# Patient Record
Sex: FEMALE | Race: ASIAN | NOT HISPANIC OR LATINO | Employment: FULL TIME | URBAN - METROPOLITAN AREA
[De-identification: names, ages, dates, MRNs, and addresses within clinical notes are randomized per-mention and may not be internally consistent; named-entity substitution may affect disease eponyms.]

---

## 2023-08-17 ENCOUNTER — OFFICE VISIT (OUTPATIENT)
Dept: URGENT CARE | Facility: URGENT CARE | Age: 23
End: 2023-08-17
Payer: COMMERCIAL

## 2023-08-17 VITALS
HEART RATE: 62 BPM | DIASTOLIC BLOOD PRESSURE: 88 MMHG | WEIGHT: 98.2 LBS | TEMPERATURE: 97.8 F | SYSTOLIC BLOOD PRESSURE: 136 MMHG | OXYGEN SATURATION: 99 %

## 2023-08-17 DIAGNOSIS — R00.2 PALPITATIONS: Primary | ICD-10-CM

## 2023-08-17 DIAGNOSIS — R42 DIZZINESS: ICD-10-CM

## 2023-08-17 PROCEDURE — 99204 OFFICE O/P NEW MOD 45 MIN: CPT | Mod: 25 | Performed by: PHYSICIAN ASSISTANT

## 2023-08-17 PROCEDURE — 93000 ELECTROCARDIOGRAM COMPLETE: CPT | Performed by: PHYSICIAN ASSISTANT

## 2023-08-17 ASSESSMENT — ENCOUNTER SYMPTOMS
CHILLS: 0
BACK PAIN: 0
EYES NEGATIVE: 1
NECK STIFFNESS: 0
JOINT SWELLING: 0
DIARRHEA: 0
ENDOCRINE NEGATIVE: 1
NAUSEA: 0
COUGH: 0
HEADACHES: 0
ARTHRALGIAS: 0
DIZZINESS: 1
SORE THROAT: 0
WOUND: 0
MYALGIAS: 0
ALLERGIC/IMMUNOLOGIC NEGATIVE: 1
VOMITING: 0
FEVER: 0
LIGHT-HEADEDNESS: 0
PALPITATIONS: 1
SHORTNESS OF BREATH: 0
NECK PAIN: 0
WEAKNESS: 0
RESPIRATORY NEGATIVE: 1
MUSCULOSKELETAL NEGATIVE: 1
RHINORRHEA: 0

## 2023-08-17 NOTE — PROGRESS NOTES
Chief Complaint:    Chief Complaint   Patient presents with    Dizziness     Pt c/o of dizziness noticed it this Saturday pt feeling dizzy while walking, mouth was dry and having heart palpitations, pt had a dizzy spell back in April and she fell in her mother driveway and bruised left side of face.      Medical Decision Making:    Vital signs reviewed by Malvin Ramirez PA-C  /88 (BP Location: Left arm, Patient Position: Sitting, Cuff Size: Adult Small)   Pulse 62   Temp 97.8  F (36.6  C) (Tympanic)   Wt 44.5 kg (98 lb 3.2 oz)   SpO2 99%       ASSESSMENT:     1. Palpitations    2. Dizziness           PLAN:     Patient is in no acute distress.  She is afebrile with stable vital signs.  She is currently asymptomatic.    EKG was negative for any ST or ischemic changes per my read.    Patient was brought to the  to make appointment to establish care.    Worrisome symptoms discussed with instructions to go to the ED.  Patient verbalized understanding and agreed with this plan.    52 minutes was spent in the care of this patient including chart review, HPI, ROS, PE, review of plan, and placing of orders.      Labs:     No results found for any visits on 08/17/23.    Current Meds:  No current outpatient medications on file.    Allergies:  No Known Allergies    SUBJECTIVE    HPI: Carson Staton is an 23 year old female who presents for evaluation and treatment of dizziness, palpitations.  Patient is here with family member who is interpreting.  Patient has had several episodes of this.  Last episode was 4 days ago.  The symptoms lasted several minutes and then resolved.  She did have similar episode 4 months ago and passed out and hit her head.  She currently is asymptomatic.      Patient is new to [facility].      ROS:      Review of Systems   Constitutional:  Negative for chills and fever.   HENT:  Negative for congestion, ear pain, rhinorrhea and sore throat.    Eyes: Negative.     Respiratory: Negative.  Negative for cough and shortness of breath.    Cardiovascular:  Positive for palpitations. Negative for chest pain.   Gastrointestinal:  Negative for diarrhea, nausea and vomiting.   Endocrine: Negative.    Genitourinary: Negative.    Musculoskeletal: Negative.  Negative for arthralgias, back pain, joint swelling, myalgias, neck pain and neck stiffness.   Skin: Negative.  Negative for rash and wound.   Allergic/Immunologic: Negative.  Negative for immunocompromised state.   Neurological:  Positive for dizziness. Negative for weakness, light-headedness and headaches.        Family History   No family history on file.    Social History  Social History     Socioeconomic History    Marital status:      Spouse name: Not on file    Number of children: Not on file    Years of education: Not on file    Highest education level: Not on file   Occupational History    Not on file   Tobacco Use    Smoking status: Never    Smokeless tobacco: Never   Substance and Sexual Activity    Alcohol use: Never    Drug use: Never    Sexual activity: Not on file   Other Topics Concern    Not on file   Social History Narrative    Not on file     Social Determinants of Health     Financial Resource Strain: Not on file   Food Insecurity: Not on file   Transportation Needs: Not on file   Physical Activity: Not on file   Stress: Not on file   Social Connections: Not on file   Intimate Partner Violence: Not on file   Housing Stability: Not on file        Surgical History:  No past surgical history on file.     Problem List:  There is no problem list on file for this patient.          OBJECTIVE:     Vital signs noted and reviewed by Malvin Ramirez PA-C  /88 (BP Location: Left arm, Patient Position: Sitting, Cuff Size: Adult Small)   Pulse 62   Temp 97.8  F (36.6  C) (Tympanic)   Wt 44.5 kg (98 lb 3.2 oz)   SpO2 99%      PEFR:    Physical Exam  Vitals and nursing note reviewed.   Constitutional:        General: She is not in acute distress.     Appearance: She is well-developed. She is not ill-appearing, toxic-appearing or diaphoretic.   HENT:      Head: Normocephalic and atraumatic.      Right Ear: Tympanic membrane and external ear normal. No drainage, swelling or tenderness. Tympanic membrane is not perforated, erythematous, retracted or bulging.      Left Ear: Tympanic membrane and external ear normal. No drainage, swelling or tenderness. Tympanic membrane is not perforated, erythematous, retracted or bulging.      Nose: No mucosal edema, congestion or rhinorrhea.      Right Sinus: No maxillary sinus tenderness or frontal sinus tenderness.      Left Sinus: No maxillary sinus tenderness or frontal sinus tenderness.      Mouth/Throat:      Pharynx: No pharyngeal swelling, oropharyngeal exudate, posterior oropharyngeal erythema or uvula swelling.      Tonsils: No tonsillar abscesses.   Eyes:      Pupils: Pupils are equal, round, and reactive to light.   Neck:      Trachea: Trachea normal.   Cardiovascular:      Rate and Rhythm: Normal rate and regular rhythm.      Heart sounds: Normal heart sounds, S1 normal and S2 normal. No murmur heard.     No friction rub. No gallop.   Pulmonary:      Effort: Pulmonary effort is normal. No respiratory distress.      Breath sounds: Normal breath sounds. No decreased breath sounds, wheezing, rhonchi or rales.   Abdominal:      General: Bowel sounds are normal. There is no distension.      Palpations: Abdomen is soft. Abdomen is not rigid. There is no mass.      Tenderness: There is no abdominal tenderness. There is no guarding or rebound.   Musculoskeletal:      Cervical back: Full passive range of motion without pain, normal range of motion and neck supple.   Lymphadenopathy:      Cervical: No cervical adenopathy.   Skin:     General: Skin is warm and dry.   Neurological:      Mental Status: She is alert and oriented to person, place, and time.      Cranial Nerves: No cranial  nerve deficit.      Deep Tendon Reflexes: Reflexes are normal and symmetric.   Psychiatric:         Behavior: Behavior normal. Behavior is cooperative.         Thought Content: Thought content normal.         Judgment: Judgment normal.             Malvin Ramirez PA-C  8/17/2023, 12:09 PM

## 2024-02-08 ENCOUNTER — APPOINTMENT (OUTPATIENT)
Dept: RADIOLOGY | Facility: HOSPITAL | Age: 24
End: 2024-02-08
Attending: EMERGENCY MEDICINE

## 2024-02-08 ENCOUNTER — HOSPITAL ENCOUNTER (EMERGENCY)
Facility: HOSPITAL | Age: 24
Discharge: HOME | End: 2024-02-08
Attending: EMERGENCY MEDICINE | Admitting: EMERGENCY MEDICINE

## 2024-02-08 VITALS
HEART RATE: 71 BPM | SYSTOLIC BLOOD PRESSURE: 123 MMHG | RESPIRATION RATE: 16 BRPM | BODY MASS INDEX: 17.94 KG/M2 | HEIGHT: 61 IN | TEMPERATURE: 98.3 F | DIASTOLIC BLOOD PRESSURE: 83 MMHG | WEIGHT: 95 LBS | OXYGEN SATURATION: 99 %

## 2024-02-08 DIAGNOSIS — R07.89 ATYPICAL CHEST PAIN: ICD-10-CM

## 2024-02-08 LAB
ALBUMIN SERPL BCG-MCNC: 4.5 G/DL (ref 3.5–5.2)
ALP SERPL-CCNC: 47 U/L (ref 40–150)
ALT SERPL W P-5'-P-CCNC: 17 U/L (ref 0–50)
ANION GAP SERPL CALCULATED.3IONS-SCNC: 16 MMOL/L (ref 7–15)
AST SERPL W P-5'-P-CCNC: 22 U/L (ref 0–45)
BASOPHILS # BLD AUTO: 0 10E3/UL (ref 0–0.2)
BASOPHILS NFR BLD AUTO: 1 %
BILIRUB DIRECT SERPL-MCNC: <0.2 MG/DL (ref 0–0.3)
BILIRUB SERPL-MCNC: 1 MG/DL
BUN SERPL-MCNC: 8.3 MG/DL (ref 6–20)
CALCIUM SERPL-MCNC: 9.2 MG/DL (ref 8.6–10)
CHLORIDE SERPL-SCNC: 107 MMOL/L (ref 98–107)
CREAT SERPL-MCNC: 0.6 MG/DL (ref 0.51–0.95)
DEPRECATED HCO3 PLAS-SCNC: 19 MMOL/L (ref 22–29)
EGFRCR SERPLBLD CKD-EPI 2021: >90 ML/MIN/1.73M2
EOSINOPHIL # BLD AUTO: 0 10E3/UL (ref 0–0.7)
EOSINOPHIL NFR BLD AUTO: 1 %
ERYTHROCYTE [DISTWIDTH] IN BLOOD BY AUTOMATED COUNT: 11.7 % (ref 10–15)
GLUCOSE SERPL-MCNC: 85 MG/DL (ref 70–99)
HCG SERPL QL: NEGATIVE
HCT VFR BLD AUTO: 44.3 % (ref 35–47)
HGB BLD-MCNC: 15.2 G/DL (ref 11.7–15.7)
IMM GRANULOCYTES # BLD: 0 10E3/UL
IMM GRANULOCYTES NFR BLD: 0 %
LIPASE SERPL-CCNC: 43 U/L (ref 13–60)
LYMPHOCYTES # BLD AUTO: 3.1 10E3/UL (ref 0.8–5.3)
LYMPHOCYTES NFR BLD AUTO: 50 %
MCH RBC QN AUTO: 29.8 PG (ref 26.5–33)
MCHC RBC AUTO-ENTMCNC: 34.3 G/DL (ref 31.5–36.5)
MCV RBC AUTO: 87 FL (ref 78–100)
MONOCYTES # BLD AUTO: 0.5 10E3/UL (ref 0–1.3)
MONOCYTES NFR BLD AUTO: 9 %
NEUTROPHILS # BLD AUTO: 2.3 10E3/UL (ref 1.6–8.3)
NEUTROPHILS NFR BLD AUTO: 39 %
NRBC # BLD AUTO: 0 10E3/UL
NRBC BLD AUTO-RTO: 0 /100
PLATELET # BLD AUTO: 196 10E3/UL (ref 150–450)
POTASSIUM SERPL-SCNC: 3.4 MMOL/L (ref 3.4–5.3)
PROT SERPL-MCNC: 7.9 G/DL (ref 6.4–8.3)
RBC # BLD AUTO: 5.1 10E6/UL (ref 3.8–5.2)
SODIUM SERPL-SCNC: 142 MMOL/L (ref 135–145)
TROPONIN T SERPL HS-MCNC: <6 NG/L
WBC # BLD AUTO: 6 10E3/UL (ref 4–11)

## 2024-02-08 PROCEDURE — 84703 CHORIONIC GONADOTROPIN ASSAY: CPT | Performed by: EMERGENCY MEDICINE

## 2024-02-08 PROCEDURE — 83690 ASSAY OF LIPASE: CPT | Performed by: EMERGENCY MEDICINE

## 2024-02-08 PROCEDURE — 36415 COLL VENOUS BLD VENIPUNCTURE: CPT | Performed by: EMERGENCY MEDICINE

## 2024-02-08 PROCEDURE — 84484 ASSAY OF TROPONIN QUANT: CPT | Performed by: EMERGENCY MEDICINE

## 2024-02-08 PROCEDURE — 250N000011 HC RX IP 250 OP 636: Performed by: EMERGENCY MEDICINE

## 2024-02-08 PROCEDURE — 99285 EMERGENCY DEPT VISIT HI MDM: CPT | Mod: 25

## 2024-02-08 PROCEDURE — 85025 COMPLETE CBC W/AUTO DIFF WBC: CPT | Performed by: EMERGENCY MEDICINE

## 2024-02-08 PROCEDURE — 93005 ELECTROCARDIOGRAM TRACING: CPT | Performed by: EMERGENCY MEDICINE

## 2024-02-08 PROCEDURE — 71046 X-RAY EXAM CHEST 2 VIEWS: CPT

## 2024-02-08 PROCEDURE — 250N000009 HC RX 250: Performed by: EMERGENCY MEDICINE

## 2024-02-08 PROCEDURE — 80053 COMPREHEN METABOLIC PANEL: CPT | Performed by: EMERGENCY MEDICINE

## 2024-02-08 PROCEDURE — 250N000013 HC RX MED GY IP 250 OP 250 PS 637: Performed by: EMERGENCY MEDICINE

## 2024-02-08 PROCEDURE — 96374 THER/PROPH/DIAG INJ IV PUSH: CPT

## 2024-02-08 PROCEDURE — 93005 ELECTROCARDIOGRAM TRACING: CPT | Performed by: STUDENT IN AN ORGANIZED HEALTH CARE EDUCATION/TRAINING PROGRAM

## 2024-02-08 RX ORDER — FAMOTIDINE 20 MG/1
20 TABLET, FILM COATED ORAL 2 TIMES DAILY
Qty: 30 TABLET | Refills: 0 | Status: SHIPPED | OUTPATIENT
Start: 2024-02-08

## 2024-02-08 RX ORDER — SUCRALFATE 1 G/1
1 TABLET ORAL 4 TIMES DAILY
Qty: 60 TABLET | Refills: 0 | Status: SHIPPED | OUTPATIENT
Start: 2024-02-08

## 2024-02-08 RX ORDER — LIDOCAINE HYDROCHLORIDE 20 MG/ML
10 SOLUTION OROPHARYNGEAL ONCE
Status: COMPLETED | OUTPATIENT
Start: 2024-02-08 | End: 2024-02-08

## 2024-02-08 RX ORDER — MAGNESIUM HYDROXIDE/ALUMINUM HYDROXICE/SIMETHICONE 120; 1200; 1200 MG/30ML; MG/30ML; MG/30ML
15 SUSPENSION ORAL ONCE
Status: COMPLETED | OUTPATIENT
Start: 2024-02-08 | End: 2024-02-08

## 2024-02-08 RX ORDER — ONDANSETRON 4 MG/1
4 TABLET, ORALLY DISINTEGRATING ORAL EVERY 8 HOURS PRN
Qty: 20 TABLET | Refills: 0 | Status: SHIPPED | OUTPATIENT
Start: 2024-02-08

## 2024-02-08 RX ORDER — ASPIRIN 81 MG/1
324 TABLET, CHEWABLE ORAL ONCE
Status: DISCONTINUED | OUTPATIENT
Start: 2024-02-08 | End: 2024-02-08

## 2024-02-08 RX ADMIN — ALUMINUM HYDROXIDE, MAGNESIUM HYDROXIDE, AND SIMETHICONE 15 ML: 200; 200; 20 SUSPENSION ORAL at 18:40

## 2024-02-08 RX ADMIN — FAMOTIDINE 20 MG: 10 INJECTION, SOLUTION INTRAVENOUS at 19:00

## 2024-02-08 RX ADMIN — LIDOCAINE HYDROCHLORIDE 10 ML: 20 SOLUTION ORAL; TOPICAL at 18:39

## 2024-02-09 NOTE — ED PROVIDER NOTES
EMERGENCY DEPARTMENT ENCOUNTER      NAME: Carson Staton  AGE: 23 year old female  YOB: 2000  MRN: [id number]  EVALUATION DATE & TIME: No admission date for patient encounter.    PCP: No Ref-Primary, Physician    ED PROVIDER: Cy Prince M.D.      Chief Complaint   Patient presents with    Chest Pain         IMPRESSION  1. Atypical chest pain        PLAN  - Pepcid/Carafate for home  - close PCP follow up  - discharge to home    ED COURSE & MEDICAL DECISION MAKING    ED Course as of 02/08/24 2206   Thu Feb 08, 2024   1908 CXR independently reviewed & interpreted by me: no acute cardiopulmonary process.   1937 23yoF with no significant past medical history presenting with her significant other for evaluation of chest pain. Reports 4 days of constant chest tightness worse with eating or swallowing; no vomiting. No pleuritic or exertional symptoms. Took Tylenol without relief. Denies history of similar.    Vitals unremarkable on exam. Calm on exam with clear lungs, normal work of breathing, benign abdomen, no CVA tenderness, no peripheral edema or calf tenderness, normal neuro exam.    Doubt acute aortic syndrome. Patient low risk for PE and PERC negative; PE ruled out at this time. EKG with no arrhythmia or ischemic changes; high-sensitivity troponin <6 which rules out ACS. CXR clear with no acute abnormality or explanatory pathology. Blood tests with negative pregnancy, normal bilirubin/LFTs/lipase, no VADIM, no glaring electrolyte abnormality, no leukocytosis, no anemia.    Patient asymptomatic after Pepcid & GI cocktail; suspect gastritis/GERD. Ok for outpatient management. Will start Pepcid and Carafate, and have her follow up in PCP clinic. Patient comfortable with discharge at this time. Return precautions and need for PCP follow up discussed and understood. No further questions at the time of discharge.       --------------------------------------------------------------------------------    --------------------------------------------------------------------------------     7:43 PM I met with the patient for the initial interview and physical examination. Discussed plan for treatment and workup in the ED.      This patient involved a high degree of complexity in medical decision making, as significant risks were present and assessed. Recent encounters & results in medical record reviewed by me.    All workup (i.e. any EKG/labs/imaging as per charting below) reviewed and independently interpreted by me. See respective sections for details.    Broad differential considered for this patient, including but not limited to:  ACS, PE, acute aortic syndrome, pneumothorax, Boerhaave's, tamponade, GERD, esophageal spasm, pancreatitis, gastritis, acute biliary process, other referred intraabdominal etiology, anxiety, chest wall pain      See additional MDM below if interested.      MEDICATIONS GIVEN IN THE EMERGENCY DEPARTMENT  Medications   famotidine (PEPCID) injection 20 mg (20 mg Intravenous $Given 2/8/24 1900)   alum & mag hydroxide-simethicone (MAALOX) suspension 15 mL (15 mLs Oral $Given 2/8/24 1840)   lidocaine (viscous) (XYLOCAINE) 2 % solution 10 mL (10 mLs Mouth/Throat $Given 2/8/24 1839)       NEW PRESCRIPTIONS STARTED AT TODAY'S ER VISIT  Discharge Medication List as of 2/8/2024  7:47 PM        START taking these medications    Details   famotidine (PEPCID) 20 MG tablet Take 1 tablet (20 mg) by mouth 2 times daily, Disp-30 tablet, R-0, E-Prescribe      ondansetron (ZOFRAN ODT) 4 MG ODT tab Take 1 tablet (4 mg) by mouth every 8 hours as needed for nausea, Disp-20 tablet, R-0, E-Prescribe      sucralfate (CARAFATE) 1 GM tablet Take 1 tablet (1 g) by mouth 4 times daily, Disp-60 tablet, R-0, E-Prescribe                 =================================================================      HPI  Use of : N/A        Missyabbieshefali Staton is a 23 year old female with no pertinent past medical  history who presents to this ED via private car with  for evaluation of chest tightness, fever, and some related shortness of breath.     On Monday (~3 days ago), patient reports an onset of a slight pain in her mid chest region.  She states that since then, the pain has worsened in severity.  Due to the pain, patient reports taking Tylenol with her last dose being at around noon today.  Patient endorses that whenever she eats any solid foods it is hard for her to swallow it makes the pain more severe.    Denies a history of this pain.  Denies vomiting.    Otherwise in normal state of health. No further concerns at this time.       --------------- MEDICAL HISTORY ---------------  PAST MEDICAL HISTORY:  Reviewed independently by me.  No past medical history on file.  There is no problem list on file for this patient.      PAST SURGICAL HISTORY:  Reviewed independently by me.  No past surgical history on file.    CURRENT MEDICATIONS:    Reviewed independently by me.  No current facility-administered medications for this encounter.    Current Outpatient Medications:     famotidine (PEPCID) 20 MG tablet, Take 1 tablet (20 mg) by mouth 2 times daily, Disp: 30 tablet, Rfl: 0    ondansetron (ZOFRAN ODT) 4 MG ODT tab, Take 1 tablet (4 mg) by mouth every 8 hours as needed for nausea, Disp: 20 tablet, Rfl: 0    sucralfate (CARAFATE) 1 GM tablet, Take 1 tablet (1 g) by mouth 4 times daily, Disp: 60 tablet, Rfl: 0    ALLERGIES:  Reviewed independently by me.  No Known Allergies    FAMILY HISTORY:  Reviewed independently by me.  No family history on file.    SOCIAL HISTORY:   Reviewed independently by me.  Social History     Socioeconomic History    Marital status:    Tobacco Use    Smoking status: Never    Smokeless tobacco: Never   Substance and Sexual Activity    Alcohol use: Never    Drug use: Never       --------------- PHYSICAL EXAM ---------------  Nursing notes and vitals independently reviewed by  "me.  VITALS:  Vitals:    02/08/24 1758 02/08/24 1807 02/08/24 1911 02/08/24 1912   BP: 121/89  123/83    BP Location: Left arm  Left arm    Pulse: 82  67 71   Resp: 12  16    Temp: 100.3  F (37.9  C)  98.3  F (36.8  C)    TempSrc: Temporal  Oral    SpO2: 100%  100% 99%   Weight:  43.1 kg (95 lb)     Height:  1.549 m (5' 1\")         PHYSICAL EXAM:    General:  alert, interactive, no distress  Eyes:  conjunctivae clear, conjugate gaze  HENT:  atraumatic, nose with no rhinorrhea, oropharynx clear  Neck:  no meningismus  Cardiovascular:  HR 70s during exam, regular rhythm, no murmurs, brisk cap refill  Chest:  no chest wall tenderness  Pulmonary:  no stridor, normal phonation, normal work of breathing, clear lungs bilaterally  Abdomen:  soft, nondistended, nontender  :  no CVA tenderness  Back:  no midline spinal tenderness  Musculoskeletal:  no pretibial edema, no calf tenderness. Gross ROM intact to joints of extremities with no obvious deformities.  Skin:  warm, dry, no rash  Neuro:  awake, alert, answers questions appropriately, follows commands, moves all limbs  Psych:  calm, normal affect      --------------- RESULTS ---------------  EKG:    Reviewed and independently interpreted by me.  - NSR at 82bpm, no ST changes, small symmetric TWI in V1-3, normal intervals  - unchanged from prior on 8/17/23  My read.    LAB:  Reviewed and independently interpreted by me.  Results for orders placed or performed during the hospital encounter of 02/08/24   XR Chest 2 Views    Impression    IMPRESSION: No focal airspace consolidation. No pleural effusion or pneumothorax.    Cardiomediastinal silhouette is normal.   Basic metabolic panel   Result Value Ref Range    Sodium 142 135 - 145 mmol/L    Potassium 3.4 3.4 - 5.3 mmol/L    Chloride 107 98 - 107 mmol/L    Carbon Dioxide (CO2) 19 (L) 22 - 29 mmol/L    Anion Gap 16 (H) 7 - 15 mmol/L    Urea Nitrogen 8.3 6.0 - 20.0 mg/dL    Creatinine 0.60 0.51 - 0.95 mg/dL    GFR Estimate " >90 >60 mL/min/1.73m2    Calcium 9.2 8.6 - 10.0 mg/dL    Glucose 85 70 - 99 mg/dL   Result Value Ref Range    Troponin T, High Sensitivity <6 <=14 ng/L   CBC with platelets and differential   Result Value Ref Range    WBC Count 6.0 4.0 - 11.0 10e3/uL    RBC Count 5.10 3.80 - 5.20 10e6/uL    Hemoglobin 15.2 11.7 - 15.7 g/dL    Hematocrit 44.3 35.0 - 47.0 %    MCV 87 78 - 100 fL    MCH 29.8 26.5 - 33.0 pg    MCHC 34.3 31.5 - 36.5 g/dL    RDW 11.7 10.0 - 15.0 %    Platelet Count 196 150 - 450 10e3/uL    % Neutrophils 39 %    % Lymphocytes 50 %    % Monocytes 9 %    % Eosinophils 1 %    % Basophils 1 %    % Immature Granulocytes 0 %    NRBCs per 100 WBC 0 <1 /100    Absolute Neutrophils 2.3 1.6 - 8.3 10e3/uL    Absolute Lymphocytes 3.1 0.8 - 5.3 10e3/uL    Absolute Monocytes 0.5 0.0 - 1.3 10e3/uL    Absolute Eosinophils 0.0 0.0 - 0.7 10e3/uL    Absolute Basophils 0.0 0.0 - 0.2 10e3/uL    Absolute Immature Granulocytes 0.0 <=0.4 10e3/uL    Absolute NRBCs 0.0 10e3/uL   hCG Qualitative Pregnancy   Result Value Ref Range    hCG Serum Qualitative Negative Negative   Hepatic function panel   Result Value Ref Range    Protein Total 7.9 6.4 - 8.3 g/dL    Albumin 4.5 3.5 - 5.2 g/dL    Bilirubin Total 1.0 <=1.2 mg/dL    Alkaline Phosphatase 47 40 - 150 U/L    AST 22 0 - 45 U/L    ALT 17 0 - 50 U/L    Bilirubin Direct <0.20 0.00 - 0.30 mg/dL   Result Value Ref Range    Lipase 43 13 - 60 U/L       RADIOLOGY:  Reviewed and independently interpreted by me. Please see official radiology report.  Recent Results (from the past 24 hour(s))   XR Chest 2 Views    Narrative    EXAM: XR CHEST 2 VIEWS  LOCATION: [facility]  DATE: 2/8/2024    INDICATION: Chest pain.  COMPARISON: None available.      Impression    IMPRESSION: No focal airspace consolidation. No pleural effusion or pneumothorax.    Cardiomediastinal silhouette is normal.       PROCEDURES:   Procedures    --------------------------------------------------------------------------------   Cardiac telemetry monitoring ordered, reviewed, and independently interpreted by me while patient was in the Emergency Department. Revealed no acute abnormalities.  --------------------------------------------------------------------------------             --------------- ADDITIONAL MDM ---------------  History:  - I considered systemic symptoms of the presenting illness.  - Supplemental history from:       -- patient, significant other  - External Record(s) reviewed:       -- Inpatient/outpatient record (clinic visit 8/17/23), prior labs, prior imaging       -- see above ED course & MDM for further details    Workup:  - Chart documentation above includes differential considered and any EKGs or imaging independently interpreted by provider.  - In additional to work up documented, I considered the following work up:       -- CTA chest/abdomen/pelvis       -- see above ED course & MDM for further details    External Consultation:  - Discussion of management with another provider:       -- ED pharmacist re: meds       -- see above charting for additional    Complicating Factors:  - Care impacted by chronic illness:       -- see above MDM, past medical history, & problem list  - Care affected by social determinants of health:       -- see above social history       -- Access to Affordable Healthcare       -- Access to Medical Care    Disposition Considerations:  - Discharge       -- I considered escalation of care with admission to the hospital, but ultimately discharged the patient per decision making above       -- I recommended the patient continue their current prescription strength medication(s) as charted above in current medications list       -- I prescribed prescription strength medication(s) as charted above       -- I recommended over-the-counter medication(s) as charted above & in discharge instructions         Klaudia TAYLOR  Asif, am serving as a scribe to document services personally performed by Dr. Cy Prince based on my observation and the provider's statements to me. I, Cy Prince MD attest that Klaudia Gold is acting in a scribe capacity, has observed my performance of the services and has documented them in accordance with my direction.      Cy Prince MD  02/08/24  Emergency Medicine  [facility] EMERGENCY DEPARTMENT  [address] [place] [id number]  [phone]  Dept: [phone]       Cy Prince MD  02/08/24 2217

## 2024-02-09 NOTE — ED TRIAGE NOTES
The pt is hmong speaking, pt states chest tightness x 4-5 days, worse now, presented with a fever, denies cough. Pt states she feels a little better when she lies down. The chest tightness makes her feel shortness of breath.      Triage Assessment (Adult)       Row Name 02/08/24 1803 02/08/24 1800       Triage Assessment    Airway WDL WDL WDL       Respiratory WDL    Respiratory WDL WDL --       Skin Circulation/Temperature WDL    Skin Circulation/Temperature WDL WDL WDL       Cardiac WDL    Cardiac WDL X;chest pain --       Chest Pain Assessment    Chest Pain Location midsternal --    Character tightness --    Associated Signs/Symptoms anxiety --    Chest Pain Intervention 12-lead ECG obtained --       Peripheral/Neurovascular WDL    Peripheral Neurovascular WDL WDL --       Cognitive/Neuro/Behavioral WDL    Cognitive/Neuro/Behavioral WDL WDL --      Row Name 02/08/24 1759          Triage Assessment    Airway WDL WDL        Respiratory WDL    Respiratory WDL WDL        Skin Circulation/Temperature WDL    Skin Circulation/Temperature WDL WDL        Cardiac WDL    Cardiac WDL X;chest pain        Chest Pain Assessment    Chest Pain Intervention 12-lead ECG obtained

## 2024-02-09 NOTE — DISCHARGE INSTRUCTIONS
Take the Pepcid & Carafate as prescribed. These help with acid-related pain. You can also try over-the-counter Tums & Maalox for this.    Use the prescribed Zofran for any nausea or vomiting.    Follow up with your Primary Care provider in 2 days for a recheck.    Return to the Emergency Department for any difficulty breathing, persistent vomiting, severe worsening, or any other concerns.

## 2024-02-19 LAB
ATRIAL RATE - MUSE: 82 BPM
DIASTOLIC BLOOD PRESSURE - MUSE: NORMAL MMHG
INTERPRETATION ECG - MUSE: NORMAL
P AXIS - MUSE: 67 DEGREES
PR INTERVAL - MUSE: 138 MS
QRS DURATION - MUSE: 84 MS
QT - MUSE: 406 MS
QTC - MUSE: 474 MS
R AXIS - MUSE: 82 DEGREES
SYSTOLIC BLOOD PRESSURE - MUSE: NORMAL MMHG
T AXIS - MUSE: 53 DEGREES
VENTRICULAR RATE- MUSE: 82 BPM

## 2024-11-15 ENCOUNTER — TELEPHONE (OUTPATIENT)
Dept: FAMILY MEDICINE | Facility: CLINIC | Age: 24
End: 2024-11-15
Payer: MEDICAID

## 2024-11-15 NOTE — TELEPHONE ENCOUNTER
[facility] phone call message- general phone call:    Reason for call: Patient partner (Malvin) called looking for OB care for patient, patient was seen at [facility] center in [facility] today, imaging was done, estimated approximately 11 weeks. Please call and do OB intake, patient does not have phone at this time, can contact partner Malvin to speak with patient, patient needs hmong interp. Thank you    Return call needed: Yes    OK to leave a message on voice mail? Yes    Primary language: ong      needed? Yes    Call taken on November 15, 2024 at 3:18 PM by Galdino Beck   
OB intake completed via phone with patient and her  was also present during intake.    Name: Carson Staton   Age:  25 yo, Hmong, Non English speaking female  : , no hx miscarriage or   Preferred language: ong, requesting   Birthplace: [place]  Level of education: has completed college back in [place]   status: , has been in the [place] for 1 year and 10 months  Occupation: working fulltime, Monday to Thursday, 4am to 2pm at [employer]  LMP: reported sure date of 2024, cycle pattern- regular menses.    FOB name: Malvin Martinez  FOB age: 38  FOB occupation: works in production  FOB phone number: [phone]    Emergency contact name: Malvin Martinez  Emergency contact number: [phone]  Emergency contact relation:     Previous pregnancy complications:  n/a    Planned, Desired  Pregnancy symptoms has experienced:  tired  nausea  Vomiting, has improved, vomiting less often.    Financial support: Yes  Support system:Yes:   Substance/Alcohol use prior to pregnancy or knowing pregnant:No  Current substance/alcohol use:No  History of psychiatric concerns:No  Abuse/violence: No  Carson was seen at [facility] today with pregnancy confirmation and basic ultrasound done. Based on LMP, ALEXUS is 2025, gestational age of 14w 5d. Basic ultrasound done today indicates gestational age of 11w 4d with ALEXUS 2025.  No identified genetic disorders or birth defects in either side of the families.  No vaginal bleeding, spotting or abdominal cramping.     Assigned OB Provider: Dr Niurka Gusman  Estimated Due Date:  2025    
Name band;

## 2024-12-11 ENCOUNTER — TELEPHONE (OUTPATIENT)
Dept: FAMILY MEDICINE | Facility: CLINIC | Age: 24
End: 2024-12-11
Payer: MEDICAID

## 2024-12-11 NOTE — TELEPHONE ENCOUNTER
General Call      Reason for Call:  Patient spouse called stating patient is having upper R abdominal pain and was told at last visit if she has this pain again to call the provider. I will route to RN's to review and advise further. Thank you    What are your questions or concerns:      Date of last appointment with provider:     Okay to leave a detailed message?: Yes at Work number on file:  There is no work phone number on file. or Cell number on file:    Telephone Information:   Mobile [phone]

## 2024-12-11 NOTE — TELEPHONE ENCOUNTER
Writer called, spoke with both patient and her . Patient experiencing intermittent, upper, abdominal discomfort is the same as from August 2024. Has continued to experience, occasionally triggered by food intake and/or when may be hungry. Per patient ok to wait until tomorrow afternoon to be seen. Janette FARR

## 2024-12-12 ENCOUNTER — OFFICE VISIT (OUTPATIENT)
Dept: FAMILY MEDICINE | Facility: CLINIC | Age: 24
End: 2024-12-12
Payer: MEDICAID

## 2024-12-12 VITALS
BODY MASS INDEX: 18.31 KG/M2 | RESPIRATION RATE: 20 BRPM | SYSTOLIC BLOOD PRESSURE: 108 MMHG | OXYGEN SATURATION: 99 % | HEIGHT: 61 IN | HEART RATE: 64 BPM | DIASTOLIC BLOOD PRESSURE: 68 MMHG | TEMPERATURE: 98.2 F | WEIGHT: 97 LBS

## 2024-12-12 DIAGNOSIS — Z3A.15 15 WEEKS GESTATION OF PREGNANCY: ICD-10-CM

## 2024-12-12 DIAGNOSIS — R10.13 EPIGASTRIC PAIN: Primary | ICD-10-CM

## 2024-12-12 NOTE — PROGRESS NOTES
Faculty Supervision of Residents   I have examined this patient and the medical care has been evaluated and discussed with the resident. See resident note outlining our discussion. Eval of epigastric pain in progress    Mandy Hernández MD

## 2024-12-12 NOTE — PROGRESS NOTES
"  {PROVIDER CHARTING PREFERENCE:918505}    Ralph Byrd is a 24 year old, presenting for the following health issues:  Pain (Inner ribcage since August ) and Refill Request (Prenatal gummies and iron pills )      12/12/2024     4:12 PM   Additional Questions   Roomed by Annmarie roberts   Accompanied by partner         12/12/2024    Information    services provided? Yes   Language Hmong   Type of interpretation provided Telephone    name khadar    Agency [employer]    phone number [phone]        HPI   Has been having some stomach cramps. Had \"mentioned this last time.\"   Since August. Intermittent. Dull aching, \"and I'm bloated.\" Doesn't radiate.   A little bit better after eating, worse when starving.   No bowel changes or urinary sxs. No vaginal bleeding.  Nausea when hungry. No reflux or burning in chest. No sour or bitter taste in mouth.   Seen in ED back in Feb for chest pain - asymptomatic after pepcid and GI cocktail. Sent on pepcid and sucralfate - took for short time until relief of symptoms. This pain feels different.   Not anemic several weeks ago. Not losing weight. No NSAID use.  No difficulty swallowing foods.   Recent immigrant from [facility].   RUQ ultrasound was ordered.      {MA/LPN/RN Pre-Provider Visit Orders- hCG/UA/Strep (Optional):208736}  {SUPERLIST (Optional):159695}  {additonal problems for provider to add (Optional):647881}    {ROS Picklists (Optional):864763}      Objective    /68   Pulse 64   Temp 98.2  F (36.8  C) (Oral)   Resp 20   Ht 1.54 m (5' 0.63\")   Wt 44 kg (97 lb)   SpO2 99%   BMI 18.55 kg/m    Body mass index is 18.55 kg/m .  Physical Exam   {Exam List (Optional):612892}    {Diagnostic Test Results (Optional):211396}        Signed Electronically by: Lalito Tejada MD  {Email feedback regarding this note to [email or web address]   :310366}  " "sucralfate - took for short time until relief of symptoms. This pain feels different.   Not anemic several weeks ago. Not losing weight. No NSAID use.  No difficulty swallowing foods.   Recent immigrant from [place].   RUQ ultrasound was ordered but not scheduled yet.     Review of Systems  Constitutional, HEENT, cardiovascular, pulmonary, gi and gu systems are negative, except as otherwise noted.      Objective    /68   Pulse 64   Temp 98.2  F (36.8  C) (Oral)   Resp 20   Ht 1.54 m (5' 0.63\")   Wt 44 kg (97 lb)   SpO2 99%   BMI 18.55 kg/m    Body mass index is 18.55 kg/m .  Physical Exam   GENERAL: alert and no distress  EYES: Eyes grossly normal to inspection, PERRL and conjunctivae and sclerae normal  RESP: lungs clear to auscultation - no rales, rhonchi or wheezes  CV: regular rate and rhythm, normal S1 S2, no S3 or S4, no murmur, click or rub, no peripheral edema  ABDOMEN: soft, nontender, gravid. No other masses, rebound tenderness or guarding.  SKIN: no suspicious lesions or rashes on limited exam  NEURO: Normal strength and tone, mentation intact and speech normal  PSYCH: mentation appears normal, affect normal/bright    No results found for any visits on 12/12/24.        Signed Electronically by: Lalito Tejada MD    "

## 2024-12-12 NOTE — PATIENT INSTRUCTIONS
Drop off stool sample and do blood work tomorrow in clinic.   AFTER you have done this, can START omeprazole 20 mg daily to see if this helps with your symptoms.   We will get you scheduled for an ultrasound to look at your liver and gallbladder as well.

## 2024-12-16 ENCOUNTER — TELEPHONE (OUTPATIENT)
Dept: FAMILY MEDICINE | Facility: CLINIC | Age: 24
End: 2024-12-16
Payer: MEDICAID

## 2024-12-16 ENCOUNTER — APPOINTMENT (OUTPATIENT)
Dept: INTERPRETER SERVICES | Facility: CLINIC | Age: 24
End: 2024-12-16
Payer: MEDICAID

## 2024-12-16 LAB — H PYLORI AG STL QL IA: POSITIVE

## 2024-12-16 NOTE — TELEPHONE ENCOUNTER
Test Results        Who ordered the test:  Dr. Tejada    Type of test: Lab    Date of test:  12/13/24    Where was the test performed:  [facility]    What are your questions/concerns?:  I gave patient results of normal liver labs. No questions at this time.    Okay to leave a detailed message?: N/A at Other phone number:

## 2024-12-17 ENCOUNTER — TELEPHONE (OUTPATIENT)
Dept: FAMILY MEDICINE | Facility: CLINIC | Age: 24
End: 2024-12-17
Payer: MEDICAID

## 2024-12-17 DIAGNOSIS — A04.8 H. PYLORI INFECTION: Primary | ICD-10-CM

## 2024-12-17 RX ORDER — CLARITHROMYCIN 500 MG/1
500 TABLET ORAL 2 TIMES DAILY
Qty: 14 TABLET | Refills: 0 | Status: SHIPPED | OUTPATIENT
Start: 2024-12-17 | End: 2024-12-24

## 2024-12-17 RX ORDER — AMOXICILLIN 500 MG/1
1000 CAPSULE ORAL 2 TIMES DAILY
Qty: 28 CAPSULE | Refills: 0 | Status: SHIPPED | OUTPATIENT
Start: 2024-12-17 | End: 2024-12-24

## 2024-12-17 NOTE — TELEPHONE ENCOUNTER
Test Results        Who ordered the test:  Lalito Tejada    Type of test: Lab    Date of test:  12/13/24    Where was the test performed:     What are your questions/concerns?:  Patient is calling regarding stool results, no annotation from provider yet. Will route message for provider to annotate and call with results thank you.    Okay to leave a detailed message?: Yes at Home number on file [phone] (home)

## 2024-12-18 NOTE — TELEPHONE ENCOUNTER
Stool testing ordered 12/13 came back positive for H pylori.   Very high suspicion the epigastric pain she has been experiencing since August is PUD (see my clinic note dated 12/12 for more details).    Patient affirms she has already picked up PPI I prescribed but not started yet. We had a risks-benefits discuss about treating infection now vs deferring until after pregnancy/breastfeeding. Patient would like to move forward with treatment given symptom burden.    Based on AAFP guidelines for pregnant patients, will treat with 7d course PPI, amoxicillin and clarithromycin (rather than metronidazole since now in 2nd trimester), all BID. Counseled patient to hold PPI script she has at home until after she completes 7d treatment with other meds. Emphasized to patient the importance of being adherent with regimen given increasing abx resistance worldwide and thus falling eradication success rates.     Sent meds to pharmacy. Next OB visit 12/23/24 with Dr. Gusman.    Will defer to Dr. Gusman on whether they will want to test for eradication immediately after treatment or wait until after delivery (may depend on whether patient feels she needs ongoing PPI therapy to manage symptoms since she would need to be off this for at least 2 weeks before re-testing stool).       Lalito Tejada MD

## 2024-12-26 ENCOUNTER — ANCILLARY PROCEDURE (OUTPATIENT)
Dept: ULTRASOUND IMAGING | Facility: CLINIC | Age: 24
End: 2024-12-26
Attending: FAMILY MEDICINE
Payer: MEDICAID

## 2024-12-26 DIAGNOSIS — R10.13 EPIGASTRIC PAIN: ICD-10-CM

## 2024-12-26 PROCEDURE — 76705 ECHO EXAM OF ABDOMEN: CPT | Mod: TC | Performed by: RADIOLOGY

## 2024-12-26 PROCEDURE — T1013 SIGN LANG/ORAL INTERPRETER: HCPCS

## 2024-12-26 NOTE — NURSING NOTE
Due to patient being non-English speaking/uses sign language, an  was used for this visit. Only for face-to-face interpretation by an external agency, date and length of interpretation can be found on the scanned worksheet.     name: Rosita Sky  Agency: [employer]  Language: Moira   Telephone number: .  Type of interpretation: Face-to-face, spoken

## 2024-12-30 DIAGNOSIS — R76.8 RED BLOOD CELL ANTIBODY POSITIVE: Primary | ICD-10-CM

## 2025-01-20 ENCOUNTER — OFFICE VISIT (OUTPATIENT)
Dept: MATERNAL FETAL MEDICINE | Facility: CLINIC | Age: 25
End: 2025-01-20
Attending: STUDENT IN AN ORGANIZED HEALTH CARE EDUCATION/TRAINING PROGRAM
Payer: MEDICAID

## 2025-01-20 ENCOUNTER — MEDICAL CORRESPONDENCE (OUTPATIENT)
Dept: HEALTH INFORMATION MANAGEMENT | Facility: CLINIC | Age: 25
End: 2025-01-20

## 2025-01-20 ENCOUNTER — HOSPITAL ENCOUNTER (OUTPATIENT)
Dept: ULTRASOUND IMAGING | Facility: CLINIC | Age: 25
Discharge: HOME | End: 2025-01-20
Attending: STUDENT IN AN ORGANIZED HEALTH CARE EDUCATION/TRAINING PROGRAM
Payer: MEDICAID

## 2025-01-20 ENCOUNTER — LAB (OUTPATIENT)
Dept: LAB | Facility: CLINIC | Age: 25
End: 2025-01-20
Attending: STUDENT IN AN ORGANIZED HEALTH CARE EDUCATION/TRAINING PROGRAM
Payer: MEDICAID

## 2025-01-20 VITALS — SYSTOLIC BLOOD PRESSURE: 95 MMHG | OXYGEN SATURATION: 100 % | HEART RATE: 74 BPM | DIASTOLIC BLOOD PRESSURE: 58 MMHG

## 2025-01-20 DIAGNOSIS — Z34.02 ENCOUNTER FOR SUPERVISION OF NORMAL FIRST PREGNANCY IN SECOND TRIMESTER: ICD-10-CM

## 2025-01-20 DIAGNOSIS — Z36.89 ENCOUNTER FOR ULTRASOUND TO CHECK FETAL GROWTH: Primary | ICD-10-CM

## 2025-01-20 DIAGNOSIS — R76.8 RED BLOOD CELL ANTIBODY POSITIVE: ICD-10-CM

## 2025-01-20 DIAGNOSIS — O28.3 ECHOGENIC INTRACARDIAC FOCUS OF FETUS ON PRENATAL ULTRASOUND: ICD-10-CM

## 2025-01-20 DIAGNOSIS — Z36.9 ENCOUNTER FOR ANTENATAL SCREENING OF MOTHER: ICD-10-CM

## 2025-01-20 DIAGNOSIS — Z34.02 ENCOUNTER FOR SUPERVISION OF NORMAL FIRST PREGNANCY IN SECOND TRIMESTER: Primary | ICD-10-CM

## 2025-01-20 PROCEDURE — 76811 OB US DETAILED SNGL FETUS: CPT

## 2025-01-20 PROCEDURE — G0463 HOSPITAL OUTPT CLINIC VISIT: HCPCS | Mod: 25 | Performed by: STUDENT IN AN ORGANIZED HEALTH CARE EDUCATION/TRAINING PROGRAM

## 2025-01-20 PROCEDURE — 96041 GENETIC COUNSELING SVC EA 30: CPT

## 2025-01-20 PROCEDURE — 36415 COLL VENOUS BLD VENIPUNCTURE: CPT

## 2025-01-20 NOTE — PROGRESS NOTES
[facility]  Genetic Counseling Consult    Patient:  Carson Staton  Preferred Name: Carson YOB: 2000   Date of Service:  1/20/25   MRN: [id number]    Carson was seen at the [facility] for genetic consultation. The indication for genetic counseling is due to a positive antibody screen (anti-M) and to discuss prenatal screening and diagnostic testing options. The patient was accompanied to this visit by their partner, Malvin.    The session was conducted with a [employer] ipad  (ID: [id number]) due to the patient speaking limited English.      IMPRESSION/ PLAN   1. Carson has not had genetic screening in this pregnancy but elected to have screening today.     2. During today's [facility] visit, Carson had a blood draw for expanded non-invasive prenatal testing (also called NIPT, NIPS, or cell-free DNA) through [facility] [employer] The expanded NIPT screens for trisomy 21, 18, and 13 and select microdeletion syndromes, including 22q11.2 deletion syndrome. The patient opted to screen for sex chromosome aneuploidies, including reported fetal sex. Results are expected in 7-14 days. The patient will be called with results and if they do not answer they requested a detailed message with results on their voicemail, including the predicted fetal sex information.  Patient was informed that results, including fetal sex, will be available in [place].    3. Since the patient chose aneuploidy screening via NIPT, quad screen is NOT recommended in the second trimester. If the patient desires screening for open neural tube defects, maternal serum AFP only is recommended, ideally between 16-18 weeks gestation.    4. Carson had a level II comprehensive anatomy ultrasound today. Please see the ultrasound report for further details.    5. Due to the positive antibody screen (anti-M), Carson's, partner, Malvin was recommended  "to have M/N antigen typing as well. Malvin consented to testing today. An authorization to share protected health information was signed at today's visit. The couple requested we call Carson with a [employer]  with results when they are available. She provided verbal permission for details to be left in her voicemail should she not answer.     6. Further recommendation include a follow-up ultrasound with [facility]. The upcoming ultrasound has been scheduled for 2025.    PREGNANCY HISTORY   /Parity:       This is Carson's first pregnancy.    CURRENT PREGNANCY   Current Age: 24 year old   Age at Delivery: 24 year old  ALEXUS: 2025, by Ultrasound                                   Gestational Age: 21w0d  This pregnancy is a single gestation.   This pregnancy was conceived spontaneously.  Positive Antibody Screen (Anti-M)  Carson had a antibody screen result positive on 2024. The antibody was identified as anti-M with a titer of 1:4.   Antibodies are made by the immune system in response to a \"foreign\" agent. In most cases, antibodies are useful since they label and tell the immune system to destroy harmful agents like virus and bacteria. However, in some cases like allergies or blood incompatibilities, antibodies are made against anything different then \"self\", even if not dangerous. During pregnancy, some antibodies can cross the placenta and reach the fetal blood.   Red blood cell antigens are similar to decorations on a red blood cells. Everyone can have a different combination of these decorations. The most important are the A and B antigens. However, other antigens can also play a role. Most antigens have two versions.   For example, people either have a \"M\" decoration or \"N\". Each individual inherits a version (same or different) from each parent. So for example the following can be true:  An individual can have the genetic information for M/M and when the lab looks at their red " "blood cells, they only see cells with the M antigen  An individual can have the genetic information for M/N and when the lab looks at their red blood cells, they see cells with both the M and N antigen  An individual can have the genetic information for N/N and when the lab looks at their red blood cells, they only see cells with The antibody screen looks for antibodies against these red blood cell antigens. In most cases someone must be exposed to an antigen before their immune system would make an antibody. If someone is exposed to blood with the same antigen as them, they will not make an antibody against that antigen. The immune system is trained to only \"fight\" something that looks new.   Exposure can occur through the following:  Pregnancy, either full term or pregnancy loss (miscarriage) (chance of exposure during injury, procedure, loss, or delivery)  Blood transfusion (during a pregnancy or other event)  Sharing needles or medical supplies   Other environemntal exposures that may have occurred early on in a person's lifetime  Teds pregnancy history was reviewed (above) and this is her first pregnancy. She has never received a blood transfusion to her knowledge.  Red blood cell antigens can cause pregnancy complications in certain circumstances. First, for the maternal antibody to cause any complications, the fetus needs to have that antigen on their red blood cell. This is possible if they inherit the genetic information for that antigen from their biological father. Second, the maternal antibody levels needs to be high enough. The antibody level is called the titer. For example a 1:16 titer means there is more antibody than a 1:2 titer. If the maternal antibodies find the antigen in the fetal blood, they may cause the fetal red blood cell to be destroyed, and progress to fetal anemia. This can cause health problems for the fetus. If there is risk for fetal anemia, ultrasound monitoring can help " "determine if the anemia is occurring. In the case of anti-M we did review that the risk of hemolytic disease of the  (HDN) is unlikely and when is does occur, features are typically mild and rarely severe.   Since Carson has anti-M antibodies, we discussed ways to understand if the fetus has the M antigen:  Malvin consented for antigen typing today. In this case, we would test for M and N.   If the father is M/M there is a 100% chance his children will inherit the M antigen. In this case Yuwilmar's antibodies could cause complications in the pregnancy  If the father is M/N there is a 50% chance his children will inherit the M antigen. In this case Carson's fetus has a 50% chance of inheriting that antigen so the antibodies have that same chance of causing complications in the pregnancy  If the father is N/N there is a 0% chance his children will inherit the M antigen. In this case Carson's antibodies have no chance of causing complications in the pregnancy (assuming paternity is certain)  If a certain answer (risk assessment) is desired an amniocentesis can be done. This is performed by collecting a sample of amniotic fluid around the baby. Since Malvin is pursuing testing today, we reviewed an amniocentesis is discussed in the case of the 50% chance for the fetus to inherit the significant antigen, but again the risk of severe HDN for M antibodies is rare.   Carson had a MFM consultation due to the positive antibody screen. Please see that note for further details.   MEDICAL HISTORY   Carson s reported medical history is not expected to impact pregnancy management or risks to fetal development.       FAMILY HISTORY   A three-generation pedigree was obtained today and is scanned under the \"Media\" tab in Epic. The family history was reported by Carson and their partner.    The following significant findings were reported today:   Carson's partner, Malvin, is currently 37 years old " and healthy.     Malvin does not know many details regarding medical information for his family. He recalls that he had a paternal half sister that was diagnosed with cancer (type unknown) in childhood and passed away. He also reports that his father  of cancer in his late 60s which was diagnosed shortly before but did not recall what type of cancer or where in the body the cancer was located.     Malvin also reports that one of his sisters had a child that had some concerns for intellectual differences after birth but reports that niece is doing well now. He also reports that his paternal uncle had never been able to talk, but does not known any information regarding any other health concerns such as hearing loss, physical or intellectual differences, or formal diagnoses made. If more is ever learned about this family history, the patient is always encouraged to update the family history so that the most accurate risk assessment and prenatal recommendations can be made.     Malvin reports that his mother had a stillborn child due to unknown reasons prior to him being born. Malvin was uncertain if a cause for the loss was ever identified or if there was ever any physical differences or birth defects noted either prenatally or postnatally. Family history such as stillbirths, infant deaths, or pregnancy complications can be difficult to assess if this occurred many decades ago since details are typically scarce. Therefore, it is challenging to assess if this family history modifies risks to the current pregnancy. This is often unlikely, especially if the history is several generations away from the current pregnancy. If more information is collected regarding this family history it should be revisited.     Otherwise, the reported family history is unremarkable for multiple miscarriages, stillbirths, birth defects, intellectual disabilities, known genetic conditions, and consanguinity.       RISK ASSESSMENT FOR  INHERITED CONDITIONS AND CARRIER SCREENING OPTIONS   Expanded carrier screening is available to screen for autosomal recessive conditions and X-linked conditions in a large list of genes. Carrier screening does not test the pregnancy but gives a risk assessment for the pregnancy and future pregnancies to have the condition. Expanded carrier screening is designed to identify carrier status for conditions that are primarily childhood or adolescent onset. Expanded carrier screening does not evaluate for adult-onset conditions such as hereditary cancer syndromes, dementia/ Alzheimer's disease, or cardiovascular disease risk factors. Additionally, expanded carrier screening is not comprehensive for all known genetic diseases or inherited conditions. Carrier screening does not test for all genetic and health conditions or risk factors.     Autosomal recessive conditions happen when a mutation has been inherited from the egg and sperm and include conditions like cystic fibrosis, thalassemia, hearing loss, spinal muscular atrophy, and more. We reviewed that when both biological parents carry a harmful genetic change in a gene associated with autosomal recessive inheritance, each of their pregnancies has a 1 in 4 (25%) chance to be affected by that condition. X-linked conditions happen when a mutation has been inherited from the egg and include conditions like fragile X syndrome.With x-linked conditions, the specific risk generally depends on the chromosomal sex of the fetus, with XY individuals (generally male) being most severely affected.     [place] screening was [place]. About [place] Screening    The patient does NOT have a family history of known inherited conditions. This does NOT mean the patient and/or their partner is not a carrier of a condition. Approximately 90% of couples at an increased reproductive risk for an inherited condition have no family history of that condition.     The patient has not had  carrier screening previously.     The patient was not certain about whether to pursue carrier screening today. They will contact us if they would like to pursue screening. See below for the more detailed information we discussed.  Carrier screening does not test the pregnancy but gives a risk assessment for the pregnancy and future pregnancies to have the condition. The only way to determine with absolutely certainty whether if a pregnancy is affected with a  genetic condition is through diagnostic testing such as a chorionic villus sampling or amniocentesis. Other options would include testing baby after delivery.   There are different size panels or list of conditions for carrier screening.  The results typically take 2-3 weeks.  The lab will communicate the out-of-pocket cost with the patient. There is an option of a $249 self pay, which needs to be designated before billing insurance.    RISK ASSESSMENT FOR CHROMOSOME CONDITIONS   We explained that the risk for fetal chromosome abnormalities increases with maternal age. We discussed specific features of common chromosome abnormalities, including trisomy 21 (Down syndrome), trisomy 13, trisomy 18, and sex chromosome trisomies.    At age 24 at midtrimester, the risk to have a baby with Down syndrome is 1 in 1114.  At age 24 at midtrimester, the risk to have a baby with any chromosome abnormality is 1 in 557.   At age 24 at delivery, the risk to have a baby with Down syndrome is 1 in 1429.   At age 24 at delivery, the risk to have a baby with any chromosome abnormality is 1 in 500.     Carson has not had genetic screening in this pregnancy but elected to have screening today.      GENETIC TESTING OPTIONS   Genetic testing during a pregnancy includes screening and diagnostic procedures.      Screening tests are non-invasive which means no risk to the pregnancy and includes ultrasounds and blood work. The benefits and limitations of screening were reviewed.  Screening tests provide a risk assessment (chance) specific to the pregnancy for certain fetal chromosome abnormalities but cannot definitively diagnose or exclude a fetal chromosome abnormality. Follow-up genetic counseling and consideration of diagnostic testing is recommended with any abnormal screening result. Diagnostic testing during a pregnancy is more certain and can test for more conditions. However, the tests do have a risk of miscarriage that requires careful consideration. These tests can detect fetal chromosome abnormalities with greater than 99% certainty. Results can be compromised by maternal cell contamination or mosaicism and are limited by the resolution of current genetic testing technology.     There is no screening or diagnostic test that detects all forms of birth defects or intellectual disability.     We discussed the following screening options:     Non-invasive prenatal testing (NIPT)  Also called cell-free DNA screening because it detects chromosomes from the placenta in the pregnant person's blood  Can be done any time after 10 weeks gestation  Standard recommendation for NIPT screens for trisomy 21, trisomy 18, trisomy 13, with the option of adding sex chromosome aneuploidies, without or without predicted sex  Cannot screen for open neural tube defects, maternal serum AFP after 15 weeks is recommended  New NIPT options include screening for other trisomies, microdeletion syndromes, and in some cases fetal blood antigens. Guidelines do not recommend these conditions are included in standard screening. These options have limitations and should be discussed with a genetic counselor.   However, current (2023) ACMG guidelines do recommend that screening for one microdeletion syndrome, called 22q11.2 deletion syndrome be offered to all pregnant patients. 22q11.2 deletion syndrome has an estimated prevalence of 1 in 990 to 1 in 2148 (0.05-0.1%). Risk is not thought to increase with maternal  age. Clinical features are variable but include congenital heart defects, cleft palate, developmental delays, immune system deficiencies, and hearing loss. Approximately 90% of cases are de francisca (a sporadic new change in a pregnancy). Cell-free DNA screening for 22q11.2 deletion syndrome is available with the inclusion of other microdeletion syndromes. There is less data about the performance of cell-free DNA screening for more rare microdeletions and the chance for false positives or negative may be increased.  We discussed the limitations of cell-free DNA screening in detecting microdeletions and the possibility of false positives and false negatives. The patient opted into microdeletion syndrome screening.     We discussed the following ultrasound options:    Comprehensive level II ultrasound (Fetal Anatomy Ultrasound)  Ultrasound done between 18-20 weeks gestation  Screens for major birth defects and markers for aneuploidy (like trisomy 21 and trisomy 18)  Includes looking at the fetus/baby's growth, heart, organs (stomach, kidneys), placenta, and amniotic fluid    We discussed the following diagnostic options:     Amniocentesis  Invasive diagnostic procedure done after 15 weeks gestation  The procedure collects a small sample of amniotic fluid for the purpose of chromosomal testing and/or other genetic testing  Diagnostic result; more than 99% sensitivity for fetal chromosome abnormalities  Testing for AFP in the amniotic fluid can test for open neural tube defects    It was a pleasure to be involved with Carson ellington care. Time spent on the day of encounter was 65 minutes.    Rosie Olguin, ANU, MS, [facility]  Board Certified and [place] Licensed Genetic Counselor  [facility]  Maternal Fetal Medicine  Office: [phone]  [facility]: [phone]   Fax: [phone]  [facility]

## 2025-01-20 NOTE — NURSING NOTE
Patient here for GC/L2/Consult for Positive Antibody. Jeyong  used via IPAD during MFM appointment.  Patient denies pain, contractions, leaking of fluid, or bleeding. SBAR given to MFM MD, see their note in Epic.

## 2025-01-20 NOTE — PROGRESS NOTES
Maternal-Fetal Medicine Consultation    Carson Staton  : 2000  MRN: [id number]    REFERRAL:  Carson Staton is a 24 year old sent by Dr. Rodarte for MFM consultation.    HPI:  Carson Staton is a 24 year old  at 21w0d by 11w4d US not consistent with LMP here for MFM consultation regarding anti-M alloimmunization.    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 Labs on 24 noted a positive antibody screen with identification of ant-M with a titer of 1:4. She reports that this is her first pregnancy with no history of miscarriages. She additionally denies receipt of blood transfusion. Her partner Malvin (and father of the baby) is present at today's appointment and plans to have M/N red cell antigen typing today.    Obstetrics History:  OB History    Para Term  AB Living   1 0 0 0 0 0   SAB IAB Ectopic Multiple Live Births   0 0 0 0 0      # Outcome Date GA Lbr Isma/2nd Weight Sex Type Anes PTL Lv   1 Current              Past Medical History:  No past medical history on file.    Past Surgical History:  No past surgical history on file.    Current Medications:  Prior to Admission medications    Medication Sig Last Dose Taking? Auth Provider Long Term End Date   omeprazole (PRILOSEC) 20 MG DR capsule Take 1 capsule (20 mg) by mouth daily.   Mandy Hernández MD     ondansetron (ZOFRAN ODT) 4 MG ODT tab Take 1 tablet (4 mg) by mouth every 8 hours as needed for nausea   Cy Prince MD     Prenatal Vit-Fe Fumarate-FA (PRENATAL MULTIVITAMIN W/IRON) 27-0.8 MG tablet Take 1 tablet by mouth daily.   Perry Lerma MD     vitamin B6 (PYRIDOXINE) 50 MG TABS Take 2 tablets (100 mg) by mouth daily as needed.   Perry Lerma MD       Allergies:  Blood-group specific substance    PHYSICAL EXAM:  Deferred     ASSESSMENT/PLAN:  Carson Staton is a 24 year old  at 21w0d by 11w4d US not consistent with LMP here for  [facility] consultation regarding anti-M alloimmunization.    #Anti-M Alloimmunization  When any fetal blood group factor inherited from the father is not possessed by the mother, antepartum or intrapartum fetal-maternal bleeding may stimulate an immune reaction in the mother. Maternal immune reactions also can occur from blood product transfusion or in some cases environmental exposures (ex: Anti-M and anti-N can be caused by gut microorganisms). The formation of maternal antibodies, or  alloimmunization,  may lead to various degrees of transplacental passage of these antibodies into the fetal circulation. Depending on the fetal antigen status and the amount and type of antibodies involved, this transplacental passage may lead to hemolytic disease in the fetus and  (HDFN). Undiagnosed and untreated, alloimmunization can lead to significant  morbidity and mortality.    We reviewed that in her case Anti-M was detected on type and screen. We reviewed that anti-M rarely causes fetal ???mi? since it is typically IgM. However, severe HDFN due to anti-M may occur if the antibody is a high-titer IgG (or a mixture of IgM and IgG) that is active at 37 C rather than room temperature. Severely affected fetuses have been hydropic and/or have received intrauterine transfusion or transfusion after birth. For this reason it is important to understand the antigen status of the pregnancy.     Today we recommended verification of the father of the baby's blood group genotype and zygosity. This will reveal one of 3 scenarios. If he does not carry the antigen, there is nothing further to do as this fetus will not have inherited the antigen and will not be at risk for hemolytic disease. If he is homozygous for the antigen, then the fetus certainly carries the antigen and management is as below. If he is heterozygous the fetus has a 50% chance of carrying the antigen. In this case, amniocentesis, can be used to identify the  fetal red cell genotyping.      If the father of the baby is homozygous for the antigen, or is heterozygous and the patient declines amniocentesis, then we recommend repeat antibody screen at 4 week intervals until 24 weeks and then increasing to every 2 weeks after 24 weeks using the same lab as the initial titers. If the patient's antibody titer rises to a critical value of 1:8, there are two main options. The first option is amniocentesis for blood cell antigen genotyping. Again, if the fetus does not carry the antigen, then the fetus is not at risk for hemolytic disease. The second, and more commonly used approach is fetal surveillance by assessment of the MCA peak systolic velocity (PSV) every 1-2 weeks. If the MCA PSV reaches > 1.5 MoM, fetal blood sampling and transfusion may be necessary. This will be coordinated by JERRELL. Please note, after reaching the critical titer for MCA PSV screening, it is no longer necessary to perform serial antibody titers.      We then reviewed the recommended management if fetal anemia is suspected, including percutaneous umbilical blood sampling (PUBS) and intrauterine fetal transfusion (IUT) and the risks of each of these procedures. The risks of IUTs including technical inability to perform the procedure, fetal demise, fetal distress requiring  delivery and the associated morbidity of  birth. The increased risks in the  period, include the increased risk of hyperbilirubinemia, anemia, and need for exchange transfusion.    Recommendations:  Paternal antigen typing was sent today.  They currently decline amniocentesis, but may consider in the future depending on the results of her partner's antigen typing. Antigen typing will reveal one of the following  Paternal antigen testing positive (homozygous): Recommend every 4 week antibody titers until 24 weeks and increasing to every 2 weeks after 24 weeks (should be performed at same lab as initial testing). If  the antibody titer reaches 1:8 please refer patient back to [facility] for MCA Doppler evaluation,  testing, serial growth ultrasound, and guidance regarding delivery timing.  Paternal antigen testing positive (heterozygous): Consider amniocentesis for fetal antigen testing. If amniocentesis is declined recommend serial titers and referral to [facility] with a critical titer as above.  Paternal antigen testing negative: No further surveillance needed.  The pediatric providers should be made aware, so they can be prepared to monitor and treat the infant.  The blood bank should be alerted upon her admission as there can be a significant delay in available blood should transfusion be necessary.  If she is at increased risk for needing transfusion she should be typed and crossed.      Additional Recommendations:  EIF: patient plans cell free DNA today  EFW 12%: Plan for repeat growth assessment with [facility] in 3 weeks.    Thank you for allowing us to participate in the care of your patient. Please do not hesitate to contact us if you have further questions regarding the management of your patient.     I have seen and evaluated the patient. I spent a total of 60 minutes on the date of this encounter including preparing to see the patient (reviewing medical records/tests), counseling and discussing the plan of care, documenting the visit in the electronic medical record, and communicating with other health care professionals and/or care coordination.      Darling Ocasio MD  Maternal Fetal Medicine   2025 12:49 PM

## 2025-01-22 ENCOUNTER — TELEPHONE (OUTPATIENT)
Dept: MATERNAL FETAL MEDICINE | Facility: CLINIC | Age: 25
End: 2025-01-22
Payer: MEDICAID

## 2025-01-22 ENCOUNTER — APPOINTMENT (OUTPATIENT)
Dept: INTERPRETER SERVICES | Facility: CLINIC | Age: 25
End: 2025-01-22
Payer: MEDICAID

## 2025-01-22 NOTE — TELEPHONE ENCOUNTER
2025    Carson returned my call regarding Malvin's antigen typing results. I called the Malvin was POSITIVE for the M antigen and POSITIVE for the N antigen. This means that Malvin is heterozygous with the genotype M/N.     I reviewed with Carson that this means there is a 50% chance that their child will inherit the M antigen that Carson has antibodies against, and a 50% chance that their child will inherit the N antigen, which would not increase the risk of hemolytic disease in the fetus and   (HDFN). In the case of anti-M we did review that the risk of hemolytic disease of the  (HDFN) is unlikely and when is does occur, features are typically mild and rarely severe.      I reviewed with Carson the option to either pursue diagnostic testing, such as amniocentesis, which would be able to determine if the fetus has N/N genotype (no risk of HDN) or N/M (increased risk of HDFN). The other option would be to continue to titer the antibodies as recommended by the [facility] physician throughout the pregnancy. I also reviewed with Carson that if the fetus was found to be heterozygous M/N she would still be recommended to continue to have regular titers drawn to monitor the pregnancy. If the fetus was found to be N/N then the fetus would not be expected to be at an increased risk of HDNF.     Carson was uncertain if she would pursue amniocentesis, but expressed that she would likely want to just follow with titers throughout pregnancy. The patient expressed that she wanted to talk with Malvin about their options. She is scheduled to return to [facility] clinic on 2025 and would like to revisit the conversation at that time. She did not wish to add another genetic counseling visit prior to that ultrasound appointment. Carson had no further questions regarding this testing at this time, but I encouraged her to contact me if question arise as she is talking through these results  with Connie Olguin MS, [facility]  Certified and Licensed Genetic Counselor  [facility]  Maternal Fetal Medicine  Office: [phone]  [facility]: [phone]   Fax: [phone]  [facility]

## 2025-01-22 NOTE — TELEPHONE ENCOUNTER
January 22, 2025    I briefly left a message for Carson with the assistance of a [employer]  (Katheryn) regarding her partner, Malvin's, antigen typing results. Malvin was POSITIVE for the M antigen and POSITIVE for the N antigen. This means that Malvin is heterozygous with the genotype M/N.     I provided my contact information and encouraged the patient to contact me when she is available to talk through these results. An authorization to share protected health information was signed with Carson's partner, Malvin, at their initial genetic counseling visit on 1/20/2024. Therefore, I did call Malvin to discuss his antigen typing results as well. I reviewed with him that there is a 50% chance the fetus inherits his M antigen, as well as the option for monitoring the pregnancy with titers or pursing amniocentesis.     Malvin explained that Jeffy is currently at work but will be off later this afternoon. She will call me when she is available to further talk through these results and decide what they would like their next steps to be for the pregnancy.     Rosie Olguin MS, [facility]  Certified and Licensed Genetic Counselor  [facility]  Maternal Fetal Medicine  Office: [phone]  [facility]: [phone]   Fax: [phone]  [facility]

## 2025-01-23 ENCOUNTER — TELEPHONE (OUTPATIENT)
Dept: FAMILY MEDICINE | Facility: CLINIC | Age: 25
End: 2025-01-23
Payer: MEDICAID

## 2025-01-23 NOTE — TELEPHONE ENCOUNTER
Called and lft message. Need patient to come in to see Dr. Gusman for ob. I called both number in patients chart.    If patient return call, please help schedule her ob visit

## 2025-01-29 ENCOUNTER — TELEPHONE (OUTPATIENT)
Dept: MATERNAL FETAL MEDICINE | Facility: CLINIC | Age: 25
End: 2025-01-29
Payer: MEDICAID

## 2025-01-29 LAB — SCANNED LAB RESULT: NORMAL

## 2025-01-29 NOTE — TELEPHONE ENCOUNTER
January 29, 2025     Per patient request, I left a detailed voicemail for Carson Staton with the assistance of a [employer]  regarding her NIPT results.      Results indicate NO ANEUPLOIDY DETECTED for chromosomes 21, 18, 13, or the sex chromosomes (XX). The screen also returned low risk for the screened microdeletion syndromes: 22q11.2 deletion, 15q11.2 deletion, 1p36 deletion, 4p, and 5p deletion syndromes. Per patient request, predicted fetal sex was disclosed in the patient's voicemail.      This puts her current pregnancy at low risk for Down syndrome, trisomy 18, trisomy 13, sex chromosome abnormalities, and the screened microdeletion syndromes. This test is reported to have the following sensitivities: Down syndrome- >99.7%, trisomy 18- >97.9%, and trisomy 13- >99.0%. Although these results are reassuring, this does not replace a standard chromosome analysis from a chorionic villus sampling or amniocentesis.       MSAFP is the appropriate second trimester screening test for open neural tube defects; the maternal quad screen is not recommended.     Her results are available in her Epic chart for her primary OB to review.     Rosie Olguin MS, [facility]  Certified and Licensed Genetic Counselor  [facility]  Maternal Fetal Medicine  Office: [phone]  [facility]: [phone]   Fax: [phone]  [facility]

## 2025-02-06 ENCOUNTER — OFFICE VISIT (OUTPATIENT)
Dept: FAMILY MEDICINE | Facility: CLINIC | Age: 25
End: 2025-02-06
Payer: MEDICAID

## 2025-02-06 VITALS
RESPIRATION RATE: 20 BRPM | SYSTOLIC BLOOD PRESSURE: 96 MMHG | WEIGHT: 102 LBS | TEMPERATURE: 98.1 F | HEIGHT: 61 IN | OXYGEN SATURATION: 100 % | DIASTOLIC BLOOD PRESSURE: 62 MMHG | HEART RATE: 65 BPM | BODY MASS INDEX: 19.26 KG/M2

## 2025-02-06 DIAGNOSIS — Z12.4 CERVICAL CANCER SCREENING: ICD-10-CM

## 2025-02-06 DIAGNOSIS — O36.1920 ANTI-M ISOIMMUNIZATION AFFECTING PREGNANCY IN SECOND TRIMESTER: ICD-10-CM

## 2025-02-06 DIAGNOSIS — Z34.02 PREGNANCY, FIRST, SECOND TRIMESTER: Primary | ICD-10-CM

## 2025-02-06 DIAGNOSIS — K59.00 CONSTIPATION, UNSPECIFIED CONSTIPATION TYPE: ICD-10-CM

## 2025-02-06 LAB
BLD GP AB SCN SERPL QL: NEGATIVE
SPECIMEN EXP DATE BLD: NORMAL

## 2025-02-06 PROCEDURE — 86850 RBC ANTIBODY SCREEN: CPT

## 2025-02-06 PROCEDURE — 36415 COLL VENOUS BLD VENIPUNCTURE: CPT

## 2025-02-06 RX ORDER — POLYETHYLENE GLYCOL 3350 17 G/17G
17 POWDER, FOR SOLUTION ORAL DAILY
Qty: 510 G | Refills: 0 | Status: SHIPPED | OUTPATIENT
Start: 2025-02-06

## 2025-02-06 NOTE — PROGRESS NOTES
23 week OB visit    Carson Staton is a 24 year old  female who presents to clinic for a follow up OB visit. She is currently 23w3d with an estimated date of delivery of 2025, by Ultrasound   She has felt baby move.     Previous concerns (this pregnancy):   Anti-M alloimmunization  Per [facility]'s recommensations 2025:  Recommendations:  Paternal antigen typing was sent today.  They currently decline amniocentesis, but may consider in the future depending on the results of her partner's antigen typing. Antigen typing will reveal one of the following  Paternal antigen testing positive (homozygous): Recommend every 4 week antibody titers until 24 weeks and increasing to every 2 weeks after 24 weeks (should be performed at same lab as initial testing). If the antibody titer reaches 1:8 please refer patient back to [facility] for MCA Doppler evaluation,  testing, serial growth ultrasound, and guidance regarding delivery timing.  Paternal antigen testing positive (heterozygous): Consider amniocentesis for fetal antigen testing. If amniocentesis is declined recommend serial titers and referral to [facility] with a critical titer as above.  Paternal antigen testing negative: No further surveillance needed.  The pediatric providers should be made aware, so they can be prepared to monitor and treat the infant.  The blood bank should be alerted upon her admission as there can be a significant delay in available blood should transfusion be necessary.  If she is at increased risk for needing transfusion she should be typed and crossed.      Dad confirmed to be heterozygous on testing, declined amniocentesis  Last titer  at 4 (drawn at 12wks), will redraw today  Discussed amniocentesis with parents, would like to re-discuss with [facility] at next visit    New concerns today:   Constipation - harder stools, occasional blood streaks if straining too much, otherwise feeling good and without nausea, vomiting, or abdominal  pain    OB ROS   Headache - No  Chest pain - No  Shortness of breath - No  Abdominal pain/contractions - No  Vaginal bleeding - No  Vaginal discharge - No  Leg swelling - No      OB History    Para Term  AB Living   1 0 0 0 0 0   SAB IAB Ectopic Multiple Live Births   0 0 0 0 0      # Outcome Date GA Lbr Isma/2nd Weight Sex Type Anes PTL Lv   1 Current                Physical exam:  There were no vitals taken for this visit.    General: alert, female in no acute distress  Abdomen: gravid uterus appropriate for gestation age at 22 cm above pubic symphysis, non-tender, FHTs 140's  Extremities: no edema    Assessment/Plan:  There are no diagnoses linked to this encounter.    Needs OGTT at next visit, will plan for her to come back next week to get done and then see me again in 2 weeks for routine OB  Has MFM follow-up , encouraged her to keep this visit    Information given on gestational diabetes. 1 hour glucose tolerance test at next visit.  Discussed signs and symptoms of  labor.   Evaluate for decreased fetal movement.   If you notice a decrease in your baby's movement. As a rule, if she perceives fewer than 10 movements in two hours at rest  If your begin to experience contractions that are 5 minutes or less apart and lasting for over 45 seconds, or if contractions are becoming more painful.  If you have any bleeding or leakage of fluids.   If you have a headache not resolved with tylenol, right upper abdominal pain, or sudden onset of swelling.    Follow up in 4 weeks for routine prenatal visit.     Niurka Gusman MD   [facility] Family Medicine Resident    Precepted with: Dr. Jimenez

## 2025-02-06 NOTE — PROGRESS NOTES
Preceptor Attestation:   Patient seen, evaluated and discussed with the resident. I have verified the content of the note, which accurately reflects my assessment of the patient and the plan of care.    Supervising Physician:Irina Jimenez MD    [facility]

## 2025-02-17 ENCOUNTER — OFFICE VISIT (OUTPATIENT)
Dept: MATERNAL FETAL MEDICINE | Facility: CLINIC | Age: 25
End: 2025-02-17
Attending: STUDENT IN AN ORGANIZED HEALTH CARE EDUCATION/TRAINING PROGRAM
Payer: MEDICAID

## 2025-02-17 ENCOUNTER — HOSPITAL ENCOUNTER (OUTPATIENT)
Dept: ULTRASOUND IMAGING | Facility: CLINIC | Age: 25
Discharge: HOME | End: 2025-02-17
Attending: STUDENT IN AN ORGANIZED HEALTH CARE EDUCATION/TRAINING PROGRAM
Payer: MEDICAID

## 2025-02-17 ENCOUNTER — TELEPHONE (OUTPATIENT)
Dept: FAMILY MEDICINE | Facility: CLINIC | Age: 25
End: 2025-02-17
Payer: MEDICAID

## 2025-02-17 VITALS — OXYGEN SATURATION: 100 % | HEART RATE: 61 BPM | DIASTOLIC BLOOD PRESSURE: 65 MMHG | SYSTOLIC BLOOD PRESSURE: 100 MMHG

## 2025-02-17 DIAGNOSIS — Z36.89 ENCOUNTER FOR ULTRASOUND TO CHECK FETAL GROWTH: ICD-10-CM

## 2025-02-17 DIAGNOSIS — O36.5990 PREGNANCY AFFECTED BY FETAL GROWTH RESTRICTION: Primary | ICD-10-CM

## 2025-02-17 DIAGNOSIS — R76.8 RED BLOOD CELL ANTIBODY POSITIVE: ICD-10-CM

## 2025-02-17 PROCEDURE — 76820 UMBILICAL ARTERY ECHO: CPT

## 2025-02-17 PROCEDURE — 59025 FETAL NON-STRESS TEST: CPT

## 2025-02-17 NOTE — TELEPHONE ENCOUNTER
General Call      Reason for Call:  I called out to patient, no answer, left vm to call back. If patient calls back please help schedule 3hr glucose lab appt, has to be fasting, for a 830am slot, if able to come in Friday 2/21/25. Thank you

## 2025-02-17 NOTE — PROGRESS NOTES
The patient was seen for an ultrasound in the [facility] clinic today.  For a detailed report of the ultrasound examination, please see the ultrasound report which can be found under the imaging tab.    If you have questions regarding today's evaluation or if we can be of further service, please contact the [facility] Center.    Jordyn Gutierrez M.D.  Maternal Fetal-Medicine Specialist

## 2025-02-17 NOTE — NURSING NOTE
Patient presents to [facility] for RL2 at 25w0d due to EFW 12%. Positive fetal movement. Denies LOF, vaginal bleeding or cramping/contractions. SBAR given to [facility] MD, see their note in Epic. Moira  utilized via ipad for patients visit.

## 2025-02-24 ENCOUNTER — OFFICE VISIT (OUTPATIENT)
Dept: FAMILY MEDICINE | Facility: CLINIC | Age: 25
End: 2025-02-24
Payer: MEDICAID

## 2025-02-24 VITALS
BODY MASS INDEX: 19.26 KG/M2 | WEIGHT: 102 LBS | HEART RATE: 61 BPM | OXYGEN SATURATION: 98 % | RESPIRATION RATE: 18 BRPM | DIASTOLIC BLOOD PRESSURE: 58 MMHG | SYSTOLIC BLOOD PRESSURE: 100 MMHG | HEIGHT: 61 IN | TEMPERATURE: 97.9 F

## 2025-02-24 DIAGNOSIS — O36.5990 PREGNANCY AFFECTED BY FETAL GROWTH RESTRICTION: ICD-10-CM

## 2025-02-24 DIAGNOSIS — O09.90 SUPERVISION OF HIGH RISK PREGNANCY, ANTEPARTUM: Primary | ICD-10-CM

## 2025-02-24 DIAGNOSIS — O36.1920 ANTI-M ISOIMMUNIZATION AFFECTING PREGNANCY IN SECOND TRIMESTER: ICD-10-CM

## 2025-02-24 DIAGNOSIS — O24.410 DIET CONTROLLED GESTATIONAL DIABETES MELLITUS (GDM) IN SECOND TRIMESTER: ICD-10-CM

## 2025-02-24 PROBLEM — O24.419 GESTATIONAL DIABETES MELLITUS (GDM) IN SECOND TRIMESTER: Status: ACTIVE | Noted: 2025-02-24

## 2025-02-24 NOTE — PROGRESS NOTES
"SUBJECTIVE:  Carson Staton is a  at 26w0d gestation by 1st trimester US who returns today for prenatal care. Estimated Date of Delivery: 2025    - Concerns today: None  - Patient reports no vaginal bleeding, no contractions/severe cramping, no leakage of fluid. Fetal movement is Present.   - No nausea/vomiting. No heartburn.   - No vaginal discharge. No dysuria.   - No headache, vision changes, lower extremity swelling, upper abdominal pain, chest pain, shortness of breath.   - Mood has been stable.    Carson Staton speaks Hmong so an  was used today.    OBJECTIVE:  /58 (BP Location: Right arm, Patient Position: Sitting)   Pulse 61   Temp 97.9  F (36.6  C) (Oral)   Resp 18   Ht 1.549 m (5' 1\")   Wt 46.3 kg (102 lb)   SpO2 98%   BMI 19.27 kg/m    Const: No distress  Abd: Gravid.   Fundal Height 22cm  FHT 140s  No lower extremity edema      Labs today: No results found for any visits on 25.    ASSESSMENT/PLAN:  24 year old , 26w0d weeks of pregnancy with ALEXUS of 2025, by Ultrasound. Pregnancy has been complicated by fetal growth restriction (EFW 12%), gestational diabetes (currently diet controlled), and anti-M alloimmunization.    Carson was seen today for prenatal care.    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   - Prequel testing negative   - Patient will continue taking prenatal vitamins and avoiding cigarettes & alcohol.     Gestational Diabetes  - Patient failed GTT 3 hour 2025  - Plan to check BG daily -- fasting, 2 hour postprandial  - Supplies ordered and education " provided  - RN only visit scheduled for further teaching   - Follow up with BG readings with Dr. Gusman in one week     Fetal Growth Restriction (EFW 12%)  US 2025 showed Growth parameters and estimated fetal weight were consistent with fetal growth restriction with EFW 12%.   Per [facility] note: We reviewed that regardless of the etiology of FGR, we recommend additional monitoring due to the increased risk for stillbirth and that the ultimate goal of management rests on balancing the risk of stillbirth with the risks of prematurity. The recommended surveillance and management of pregnancies complicated by FGR includes serial assessment of fetal growth (every 3 weeks) in addition to weekly UA Dopplers and  surveillance. Timing of delivery will depend on Doppler findings, amniotic fluid, fetal monitoring, and interval fetal growth; we will make specific recommendations as the pregnancy progresses.  - weekly UA dopplers and  surveillance  - assessment of fetal growth every 3 weeks   - continue to follow with [facility]     Anti-M Alloimmunization  Recommendations: (from [facility] 2025)  Paternal antigen typing was sent today.  They currently decline amniocentesis, but may consider in the future depending on the results of her partner's antigen typing. Antigen typing will reveal one of the following  Paternal antigen testing positive (homozygous): Recommend every 4 week antibody titers until 24 weeks and increasing to every 2 weeks after 24 weeks (should be performed at same lab as initial testing). If the antibody titer reaches 1:8 please refer patient back to [facility] for MCA Doppler evaluation,  testing, serial growth ultrasound, and guidance regarding delivery timing.  Paternal antigen testing positive (heterozygous): Consider amniocentesis for fetal antigen testing. If amniocentesis is declined recommend serial titers and referral to [facility] with a critical titer as above.  Paternal antigen testing  negative: No further surveillance needed.  The pediatric providers should be made aware, so they can be prepared to monitor and treat the infant.  The blood bank should be alerted upon her admission as there can be a significant delay in available blood should transfusion be necessary.  If she is at increased risk for needing transfusion she should be typed and crossed.    - Dad confirmed to be heterozygous on testing, declined amniocentesis  - Last titer 2/6/2025 negative.  Plan to redraw today.   - Discussed amniocentesis with parents, would like to re-discuss with MFM at next visit    -Patient recommended to see OB educator/RN today and/or future visit for education and/or care coordination.    Return to clinic in 1 week.    Options for treatment and/or follow-up care were reviewed with the patient. Carson Staton was engaged and actively involved in the decision making process. She verbalized understanding of the options discussed and was satisfied with the final plan.    Precepted with Dr. Jaelyn Hunter MD

## 2025-02-24 NOTE — PROGRESS NOTES
Preceptor Attestation:  Patient's case reviewed and discussed with the resident, Erinn Hunter MD, and I personally evaluated the patient. I agree with written assessment and plan of care.    Supervising Physician:  Jaelyn Roe MD   [facility]

## 2025-02-24 NOTE — LETTER
2025    Carson Staton   2000        To Whom it May Concern;    Please excuse Carson Staton from work/school for a healthcare visit on 2025. She has now has a diagnosis that requires her to check her blood sugar 4 times a day. She needs accomodates to allow time and space to complete this.    Sincerely,        Erinn Hunter MD

## 2025-02-25 ENCOUNTER — OFFICE VISIT (OUTPATIENT)
Dept: MATERNAL FETAL MEDICINE | Facility: CLINIC | Age: 25
End: 2025-02-25
Attending: STUDENT IN AN ORGANIZED HEALTH CARE EDUCATION/TRAINING PROGRAM
Payer: MEDICAID

## 2025-02-25 ENCOUNTER — HOSPITAL ENCOUNTER (OUTPATIENT)
Dept: ULTRASOUND IMAGING | Facility: CLINIC | Age: 25
Discharge: HOME | End: 2025-02-25
Attending: STUDENT IN AN ORGANIZED HEALTH CARE EDUCATION/TRAINING PROGRAM
Payer: MEDICAID

## 2025-02-25 VITALS — SYSTOLIC BLOOD PRESSURE: 97 MMHG | HEART RATE: 73 BPM | OXYGEN SATURATION: 97 % | DIASTOLIC BLOOD PRESSURE: 63 MMHG

## 2025-02-25 DIAGNOSIS — O36.5990 PREGNANCY AFFECTED BY FETAL GROWTH RESTRICTION: Primary | ICD-10-CM

## 2025-02-25 DIAGNOSIS — O36.5990 PREGNANCY AFFECTED BY FETAL GROWTH RESTRICTION: ICD-10-CM

## 2025-02-25 LAB
BLD GP AB SCN SERPL QL: NEGATIVE
BLOOD BANK CHART COMMENT: NORMAL
ERYTHROCYTE [DISTWIDTH] IN BLOOD BY AUTOMATED COUNT: 11.7 % (ref 10–15)
HCT VFR BLD AUTO: 36.2 % (ref 35–47)
HGB BLD-MCNC: 12.1 G/DL (ref 11.7–15.7)
MCH RBC QN AUTO: 30.7 PG (ref 26.5–33)
MCHC RBC AUTO-ENTMCNC: 33.4 G/DL (ref 31.5–36.5)
MCV RBC AUTO: 92 FL (ref 78–100)
PLATELET # BLD AUTO: 164 10E3/UL (ref 150–450)
RBC # BLD AUTO: 3.94 10E6/UL (ref 3.8–5.2)
SPECIMEN EXP DATE BLD: NORMAL
SPECIMEN EXP DATE BLD: NORMAL
WBC # BLD AUTO: 9 10E3/UL (ref 4–11)

## 2025-02-25 PROCEDURE — 76820 UMBILICAL ARTERY ECHO: CPT

## 2025-02-25 PROCEDURE — 59025 FETAL NON-STRESS TEST: CPT

## 2025-02-25 NOTE — NURSING NOTE
Moira  used via IPAD during MFM appointment.   Patient reports fetal fetal movement, denies pain, contractions, leaking of fluid, or bleeding.  New GDM-has not started checking blood sugars yet. Has diabetic Ed on 2/27/25. Patient denies headache, visual changes, nausea/vomiting, epigastric pain related to preeclampsia. SBAR given to MFM MD, see their note in Epic.    NST Performed due to FGR.   reviewed efm tracing. See NST/BPP Doc Flowsheet tab.

## 2025-02-25 NOTE — PROGRESS NOTES
Please see full imaging report from [employer] program under imaging tab.    Roxanna Good MD  Maternal Fetal Medicine

## 2025-02-27 ENCOUNTER — OFFICE VISIT (OUTPATIENT)
Dept: FAMILY MEDICINE | Facility: CLINIC | Age: 25
End: 2025-02-27
Payer: MEDICAID

## 2025-02-27 DIAGNOSIS — O24.410 DIET CONTROLLED GESTATIONAL DIABETES MELLITUS (GDM) IN SECOND TRIMESTER: Primary | ICD-10-CM

## 2025-02-27 NOTE — PROGRESS NOTES
Patient and her spouse were present today for glucometer education. Verbal and demonstration on how to use Accu chek guide glucometer, test strips and lancets in lancet device done with patient and spouse. Writer observed,  Carson perform self blood glucose check, result- 134. Ate within past hour, a bowl of rice.  Reviewed and recommend 3-4 bs checks at home and glucose range for 2 hours postprandials. Will log blood glucose and bring in next week for follow up. Janette FARR

## 2025-03-03 ENCOUNTER — HOSPITAL ENCOUNTER (OUTPATIENT)
Dept: ULTRASOUND IMAGING | Facility: CLINIC | Age: 25
Discharge: HOME | End: 2025-03-03
Attending: STUDENT IN AN ORGANIZED HEALTH CARE EDUCATION/TRAINING PROGRAM
Payer: COMMERCIAL

## 2025-03-03 ENCOUNTER — OFFICE VISIT (OUTPATIENT)
Dept: MATERNAL FETAL MEDICINE | Facility: CLINIC | Age: 25
End: 2025-03-03
Attending: STUDENT IN AN ORGANIZED HEALTH CARE EDUCATION/TRAINING PROGRAM
Payer: COMMERCIAL

## 2025-03-03 VITALS — SYSTOLIC BLOOD PRESSURE: 96 MMHG | DIASTOLIC BLOOD PRESSURE: 60 MMHG | HEART RATE: 76 BPM | OXYGEN SATURATION: 99 %

## 2025-03-03 DIAGNOSIS — O36.5990 PREGNANCY AFFECTED BY FETAL GROWTH RESTRICTION: ICD-10-CM

## 2025-03-03 PROCEDURE — 76820 UMBILICAL ARTERY ECHO: CPT

## 2025-03-03 PROCEDURE — 76820 UMBILICAL ARTERY ECHO: CPT | Mod: 26 | Performed by: STUDENT IN AN ORGANIZED HEALTH CARE EDUCATION/TRAINING PROGRAM

## 2025-03-03 PROCEDURE — 76815 OB US LIMITED FETUS(S): CPT | Mod: 26 | Performed by: STUDENT IN AN ORGANIZED HEALTH CARE EDUCATION/TRAINING PROGRAM

## 2025-03-03 PROCEDURE — 59025 FETAL NON-STRESS TEST: CPT | Mod: 26 | Performed by: STUDENT IN AN ORGANIZED HEALTH CARE EDUCATION/TRAINING PROGRAM

## 2025-03-03 PROCEDURE — 59025 FETAL NON-STRESS TEST: CPT

## 2025-03-03 NOTE — NURSING NOTE
Carson presents to [facility] for weekly surveillance due to FGR. Patient reports good fetal movement, denies contractions, leaking of fluid, or bleeding.  NST Performed due to FGR.  Dr.M. Ocasio reviewed efm tracing. See NST/BPP Doc Flowsheet tab. SBAR given to M MD, see their note in Epic.

## 2025-03-03 NOTE — PROGRESS NOTES
"Please see \"Imaging\" tab under \"Chart Review\" for details of today's visit.    Darling Ocasio    "

## 2025-03-07 ENCOUNTER — OFFICE VISIT (OUTPATIENT)
Dept: FAMILY MEDICINE | Facility: CLINIC | Age: 25
End: 2025-03-07
Payer: COMMERCIAL

## 2025-03-07 VITALS
OXYGEN SATURATION: 98 % | DIASTOLIC BLOOD PRESSURE: 60 MMHG | HEART RATE: 65 BPM | TEMPERATURE: 98.4 F | SYSTOLIC BLOOD PRESSURE: 97 MMHG | RESPIRATION RATE: 18 BRPM | WEIGHT: 103 LBS | HEIGHT: 61 IN | BODY MASS INDEX: 19.45 KG/M2

## 2025-03-07 DIAGNOSIS — Z12.4 CERVICAL CANCER SCREENING: ICD-10-CM

## 2025-03-07 DIAGNOSIS — O36.1920 ANTI-M ISOIMMUNIZATION AFFECTING PREGNANCY IN SECOND TRIMESTER: Primary | ICD-10-CM

## 2025-03-07 DIAGNOSIS — Z34.01 ENCOUNTER FOR SUPERVISION OF NORMAL FIRST PREGNANCY IN FIRST TRIMESTER: ICD-10-CM

## 2025-03-07 LAB
BLD GP AB SCN SERPL QL: NEGATIVE
SPECIMEN EXP DATE BLD: NORMAL
T PALLIDUM AB SER QL: NONREACTIVE

## 2025-03-07 NOTE — PROGRESS NOTES
Preceptor Attestation:   Patient seen, evaluated and discussed with the resident Dr. Parag Sheikh. I have verified the content of the note, which accurately reflects my assessment of the patient and the plan of care.    Supervising Physician:  Benjamin Rosenstein, MD, MA  [facility] Faculty  [facility]

## 2025-03-07 NOTE — PROGRESS NOTES
Prior to immunization administration, verified patients identity using patient s name and date of birth. Please see Immunization Activity for additional information.     Screening Questionnaire for Adult Immunization    Are you sick today?   No   Do you have allergies to medications, food, a vaccine component or latex?   No   Have you ever had a serious reaction after receiving a vaccination?   No   Do you have a long-term health problem with heart, lung, kidney, or metabolic disease (e.g., diabetes), asthma, a blood disorder, no spleen, complement component deficiency, a cochlear implant, or a spinal fluid leak?  Are you on long-term aspirin therapy?   No   Do you have cancer, leukemia, HIV/AIDS, or any other immune system problem?   No   Do you have a parent, brother, or sister with an immune system problem?   No   In the past 3 months, have you taken medications that affect  your immune system, such as prednisone, other steroids, or anticancer drugs; drugs for the treatment of rheumatoid arthritis, Crohn s disease, or psoriasis; or have you had radiation treatments?   No   Have you had a seizure, or a brain or other nervous system problem?   No   During the past year, have you received a transfusion of blood or blood    products, or been given immune (gamma) globulin or antiviral drug?   No   For women: Are you pregnant or is there a chance you could become       pregnant during the next month?   No   Have you received any vaccinations in the past 4 weeks?   No     Immunization questionnaire answers were all negative.      Patient instructed to remain in clinic for 15 minutes afterwards, and to report any adverse reactions.     Screening performed by Vic Gonzalez CMA on 3/7/2025 at 4:25 PM.

## 2025-03-07 NOTE — PROGRESS NOTES
"Carson Staton is a 24 year old  female who presents to clinic for a follow up OB visit. She is currently 27w4d with an estimated date of delivery of 2025 via 1st trimester US.    Per most recent OB note: Has high risk pregnancy followed by MFM. Complicated by FGR (EFW 12% at last MF US on 3/3), having weekly US w/ cord dopplers. Has Anti-M alloimmunization, paternal heterozygous, declined amniocentesis, now getting serial titers. Diagnosed with gestational diabetes at last visit with positive 3hr gtt. Was instructed to check BG 3-4 times daily, with 2hr postprandials, and bring to visit today.     Blood sugars checks: AM fasting, 1-2hr after breakfast, and after dinner. Highest sugars were 106, 109, and 119 about 2 hour after dinner (119 was with a snack in between).    US weekly with M, including next Tuesday, and then potential change to frequency after that.    She denies headache, chest pain, shortness of breath, abdominal pain/contractions, vaginal bleeding, or abnormal discharge. Taking PNV daily. She has felt baby move.     New concerns today: none    OB History    Para Term  AB Living   1 0 0 0 0 0   SAB IAB Ectopic Multiple Live Births   0 0 0 0 0      # Outcome Date GA Lbr Isma/2nd Weight Sex Type Anes PTL Lv   1 Current                Physical exam:  BP 97/60   Pulse 65   Temp 98.4  F (36.9  C) (Tympanic)   Resp 18   Ht 1.54 m (5' 0.63\")   Wt 46.7 kg (103 lb)   SpO2 98%   BMI 19.70 kg/m      General: alert, female in no acute distress  Abdomen: gravid uterus small for gestation age at 22 cm above pubic symphysis, non-tender, FHTs 140  Extremities: no edema    Assessment/Plan:  Patient Active Problem List   Diagnosis    Gestational diabetes mellitus (GDM) in second trimester    Pregnancy affected by fetal growth restriction    Anti-M isoimmunization affecting pregnancy in second trimester       Carson Staton is a 24 year old  female at 27w4d here for initial OB " visit. Prenatal course complicated by FGR, Anti-M alloimmunization, GDM.     Routine prenatal care   - Gestational diabetes:   - fasting sugars < 100, post prandial < 120, no need for pharmacological treatment at this time, continue 3 times daily POCT checks  - RPR today  - TDAP due, agreed to this today, administered  - Influenza vaccine agreed and administered   - declined more than two vaccines today  - CBC done at last visit, results normal  - Blood type B positive. Rhogam not indicated.  - Discussed signs and symptoms of  labor.   - Evaluate for decreased fetal movement.   If you notice a decrease in your baby's movement. As a rule, if she perceives fewer than 10 movements in two hours at rest  If your begin to experience contractions that are 5 minutes or less apart and lasting for over 45 seconds, or if contractions are becoming more painful.  If you have any bleeding or leakage of fluids.   If you have a headache not resolved with tylenol, right upper abdominal pain, or sudden onset of swelling.      Discussed birth plans:   Clinic/Hospital: [facility]  Fetal sex: Female, based on genetic testing  Childrens names/ages: NA  Previous labor experiences: NA  Pertinent History: NA  PP contraception: TBD  Hx PPD/Depression/Anxiety: NA  [facility]  Plans for labor pain management: Discussed options at length today, patient will discuss with partner and think about it  Feeding preference: TBD, debating  Feeding history: NA  Breast pump: To be discussed in future visits, limited by time today  Car seat: To be discussed in future visits, limited by time today  Circumcision: NA  Tdap: given today  Flu: given today  COVID: declined today, offer again at next visit  Hep B: declined today, offer again at next visit      Follow up in 2 weeks for routine 30w prenatal visit.     Parag Sheikh MD   [facility] Medicine Resident    Precepted with: Dr. Benjamin Rosenstein

## 2025-03-11 ENCOUNTER — OFFICE VISIT (OUTPATIENT)
Dept: MATERNAL FETAL MEDICINE | Facility: CLINIC | Age: 25
End: 2025-03-11
Attending: STUDENT IN AN ORGANIZED HEALTH CARE EDUCATION/TRAINING PROGRAM
Payer: COMMERCIAL

## 2025-03-11 ENCOUNTER — HOSPITAL ENCOUNTER (OUTPATIENT)
Dept: ULTRASOUND IMAGING | Facility: CLINIC | Age: 25
Discharge: HOME | End: 2025-03-11
Attending: STUDENT IN AN ORGANIZED HEALTH CARE EDUCATION/TRAINING PROGRAM
Payer: COMMERCIAL

## 2025-03-11 VITALS — SYSTOLIC BLOOD PRESSURE: 95 MMHG | HEART RATE: 74 BPM | DIASTOLIC BLOOD PRESSURE: 59 MMHG

## 2025-03-11 DIAGNOSIS — O36.5990 PREGNANCY AFFECTED BY FETAL GROWTH RESTRICTION: ICD-10-CM

## 2025-03-11 PROCEDURE — 59025 FETAL NON-STRESS TEST: CPT | Mod: 26 | Performed by: OBSTETRICS & GYNECOLOGY

## 2025-03-11 PROCEDURE — 76820 UMBILICAL ARTERY ECHO: CPT | Mod: 26 | Performed by: OBSTETRICS & GYNECOLOGY

## 2025-03-11 PROCEDURE — 59025 FETAL NON-STRESS TEST: CPT

## 2025-03-11 PROCEDURE — 76820 UMBILICAL ARTERY ECHO: CPT

## 2025-03-11 PROCEDURE — T1013 SIGN LANG/ORAL INTERPRETER: HCPCS | Mod: U4 | Performed by: INTERPRETER

## 2025-03-11 PROCEDURE — 76816 OB US FOLLOW-UP PER FETUS: CPT | Mod: 26 | Performed by: OBSTETRICS & GYNECOLOGY

## 2025-03-11 NOTE — NURSING NOTE
Carson presents to [facility] for surveillance due to FGR. [employer]  used via IPAD for visit. Patient reports good fetal movement, denies contractions, leaking of fluid, or bleeding.  Reports blood sugar values are well controlled.  NST Performed due to FGR.   reviewed efm tracing. See NST/BPP Doc Flowsheet tab. SBAR given to MARIA DEL ROSARIO MD, see their note in Epic.

## 2025-03-17 ENCOUNTER — OFFICE VISIT (OUTPATIENT)
Dept: MATERNAL FETAL MEDICINE | Facility: CLINIC | Age: 25
End: 2025-03-17
Attending: STUDENT IN AN ORGANIZED HEALTH CARE EDUCATION/TRAINING PROGRAM
Payer: COMMERCIAL

## 2025-03-17 ENCOUNTER — HOSPITAL ENCOUNTER (OUTPATIENT)
Dept: ULTRASOUND IMAGING | Facility: CLINIC | Age: 25
Discharge: HOME | End: 2025-03-17
Attending: STUDENT IN AN ORGANIZED HEALTH CARE EDUCATION/TRAINING PROGRAM
Payer: COMMERCIAL

## 2025-03-17 VITALS
OXYGEN SATURATION: 97 % | DIASTOLIC BLOOD PRESSURE: 61 MMHG | HEART RATE: 55 BPM | SYSTOLIC BLOOD PRESSURE: 93 MMHG | RESPIRATION RATE: 18 BRPM

## 2025-03-17 DIAGNOSIS — O36.5990 PREGNANCY AFFECTED BY FETAL GROWTH RESTRICTION: ICD-10-CM

## 2025-03-17 PROCEDURE — 76820 UMBILICAL ARTERY ECHO: CPT

## 2025-03-17 NOTE — NURSING NOTE
Patient reports good fetal movement, denies pain, denies contractions, leaking of fluid, or bleeding.  Patient denies headache, visual changes, nausea/vomiting, epigastric pain related to preeclampsia.  SBAR given to JERRELL CABELLO, see their note in Epic.

## 2025-03-20 NOTE — PROGRESS NOTES
"29 week OB visit    SUBJECTIVE:  Carson Staton is a  at 29w3d gestation by first trimester ultrasound who returns today for prenatal care. Estimated Date of Delivery: 2025    - Concerns this pregnancy:  GDMA1  - BG looks good, fastings in the 80's-90's, only one postprandial was 117  - only checking BG in AM and with biggest meal  - feeling good, has changed to brown rice, otherwise doing well    FGR  - seeing [facility], most recent ultrasound stable, EFW 9%  - no concerns    Anti-M alloimmunization  - needs biweekly titers per [facility] recommendations  - otherwise no other symptoms      - Patient reports no vaginal bleeding, no contractions/severe cramping, no leakage of fluid. Contractions absent. Fetal movement is Present.   - No nausea/vomiting. No heartburn.   - No vaginal discharge. No dysuria.   - No headache, vision changes, lower extremity swelling, upper abdominal pain, chest pain, shortness of breath.   - Mood has been good.    Going to [facility]? No, will still set up visit    Do you need help getting a car seat? YES  Do you need help getting a breast pump? YES      Carson Staton speaks Hmong so an  was used today.    OBJECTIVE:  BP 91/53   Pulse 67   Temp 98.2  F (36.8  C) (Oral)   Resp 18   Ht 1.55 m (5' 1.02\")   Wt 48.1 kg (106 lb)   SpO2 97%   BMI 20.01 kg/m    Const: No distress  Abd: Gravid.   See flowsheet for fundal height, FHTs, edema, cervical exam.    Labs today: No results found for any visits on 25.    ASSESSMENT/PLAN:  24 year old , 29w3d weeks of pregnancy with ALEXUS of 2025, by Ultrasound, confirmed by first trimester week US.    Carson was seen today for prenatal care.    Diagnoses and all orders for this visit:    Encounter for pregnancy related examination in third trimester  Overall doing well. Baby moving well, no other concerns today. Individual problems addressed as below, follow-up in 2 weeks with me    Anti-M isoimmunization affecting pregnancy " in third trimester  Last titer negative, will redraw today per [facility]'s recommendations from 2025:  Paternal antigen testing positive (homozygous): Recommend every 4 week antibody titers until 24 weeks and increasing to every 2 weeks after 24 weeks (should be performed at same lab as initial testing). If the antibody titer reaches 1:8 please refer patient back to [facility] for MCA Doppler evaluation,  testing, serial growth ultrasound, and guidance regarding delivery timing.  Paternal antigen testing negative: No further surveillance needed.  -     Antibody titer red cell; Future  -     Antibody titer red cell    Diet controlled gestational diabetes mellitus (GDM) in third trimester  Fasting BG in 80's-90's, only checking BG with biggest meal. Highest 1 hr postprandial at 117, likely well controlled at this point. With known FGR and pregnancy weight gain of 7 lbs, recommend that they do NOT restrict her portion sizes for now. Would recommend we continue to monitor her BG closely, as if they worsen we will need to start insulin. Plan for follow-up in 2 weeks    Pregnancy affected by fetal growth restriction  EFW at last [facility] visit 9%, slowly increasing. Complicated by GDMA1 as above, encouraged appropriate intake. Follow-up in 2 weeks, appreciate [facility] assistance.    - Contraception options after delivery: patient is interested in Undecided, will discuss and let me know at next visit.  -Tdap and flu shot this pregnancy: up to date, given at last visit  - Counseled patient on topics as marked in the OB checklist.    - Patient will continue taking prenatal vitamins and avoiding cigarettes & alcohol.   -Breastfeeding education discussed. More details in Breastfeeding AVS  - Supporting a non-medicated birth  - Skin to Skin  - Non-separation of mother and baby  - Delivery plan completed with patient in the AVS.    -Patient recommended to see OB educator/RN today and/or future visit for education and/or care  coordination.    - Return to clinic in 2 weeks.      Options for treatment and/or follow-up care were reviewed with the patient. Annor Brionna was engaged and actively involved in the decision making process. She verbalized understanding of the options discussed and was satisfied with the final plan.    Precepted with Dr. Soha Gumsan MD

## 2025-03-21 ENCOUNTER — OFFICE VISIT (OUTPATIENT)
Dept: FAMILY MEDICINE | Facility: CLINIC | Age: 25
End: 2025-03-21
Payer: COMMERCIAL

## 2025-03-21 VITALS
BODY MASS INDEX: 20.01 KG/M2 | DIASTOLIC BLOOD PRESSURE: 53 MMHG | WEIGHT: 106 LBS | OXYGEN SATURATION: 97 % | HEART RATE: 67 BPM | HEIGHT: 61 IN | SYSTOLIC BLOOD PRESSURE: 91 MMHG | RESPIRATION RATE: 18 BRPM | TEMPERATURE: 98.2 F

## 2025-03-21 DIAGNOSIS — O24.410 DIET CONTROLLED GESTATIONAL DIABETES MELLITUS (GDM) IN THIRD TRIMESTER: ICD-10-CM

## 2025-03-21 DIAGNOSIS — O36.5990 PREGNANCY AFFECTED BY FETAL GROWTH RESTRICTION: ICD-10-CM

## 2025-03-21 DIAGNOSIS — Z34.93 ENCOUNTER FOR PREGNANCY RELATED EXAMINATION IN THIRD TRIMESTER: Primary | ICD-10-CM

## 2025-03-21 DIAGNOSIS — O36.1930 ANTI-M ISOIMMUNIZATION AFFECTING PREGNANCY IN THIRD TRIMESTER: ICD-10-CM

## 2025-03-21 LAB
BLD GP AB SCN SERPL QL: NEGATIVE
SPECIMEN EXP DATE BLD: NORMAL

## 2025-03-21 NOTE — PROGRESS NOTES
Preceptor Attestation:   Patient seen, evaluated and discussed with the resident Dr. Niurka Gusman. I have verified the content of the note, which accurately reflects my assessment of the patient and the plan of care.    Supervising Physician:  Benjamin Rosenstein, MD, MA  [facility]  [facility]

## 2025-03-25 ENCOUNTER — OFFICE VISIT (OUTPATIENT)
Dept: MATERNAL FETAL MEDICINE | Facility: CLINIC | Age: 25
End: 2025-03-25
Attending: STUDENT IN AN ORGANIZED HEALTH CARE EDUCATION/TRAINING PROGRAM
Payer: COMMERCIAL

## 2025-03-25 ENCOUNTER — HOSPITAL ENCOUNTER (OUTPATIENT)
Dept: ULTRASOUND IMAGING | Facility: CLINIC | Age: 25
Discharge: HOME | End: 2025-03-25
Attending: OBSTETRICS & GYNECOLOGY
Payer: COMMERCIAL

## 2025-03-25 VITALS — SYSTOLIC BLOOD PRESSURE: 92 MMHG | DIASTOLIC BLOOD PRESSURE: 58 MMHG | HEART RATE: 63 BPM | OXYGEN SATURATION: 97 %

## 2025-03-25 DIAGNOSIS — O36.5990 PREGNANCY AFFECTED BY FETAL GROWTH RESTRICTION: ICD-10-CM

## 2025-03-25 DIAGNOSIS — O36.5990 PREGNANCY AFFECTED BY FETAL GROWTH RESTRICTION: Primary | ICD-10-CM

## 2025-03-25 PROCEDURE — 59025 FETAL NON-STRESS TEST: CPT

## 2025-03-25 PROCEDURE — 76820 UMBILICAL ARTERY ECHO: CPT

## 2025-03-25 NOTE — NURSING NOTE
Patient reports fetal movement, denies pain, contractions, leaking of fluid, or bleeding.  Reports blood sugar values fasting 80-90 and one hr post prandial <140. Controlled on diet.  Patient denies headache, visual changes, nausea/vomiting, epigastric pain related to preeclampsia.  SBAR given to MFMARIA DEL ROSARIO CABELLO, see their note in Epic.    NST Performed due to FGR.   reviewed efm tracing. See NST/BPP Doc Flowsheet tab.

## 2025-04-03 ENCOUNTER — OFFICE VISIT (OUTPATIENT)
Dept: MATERNAL FETAL MEDICINE | Facility: CLINIC | Age: 25
End: 2025-04-03
Attending: STUDENT IN AN ORGANIZED HEALTH CARE EDUCATION/TRAINING PROGRAM
Payer: COMMERCIAL

## 2025-04-03 ENCOUNTER — HOSPITAL ENCOUNTER (OUTPATIENT)
Dept: ULTRASOUND IMAGING | Facility: CLINIC | Age: 25
Discharge: HOME | End: 2025-04-03
Attending: OBSTETRICS & GYNECOLOGY
Payer: COMMERCIAL

## 2025-04-03 DIAGNOSIS — O36.5990 PREGNANCY AFFECTED BY FETAL GROWTH RESTRICTION: ICD-10-CM

## 2025-04-03 DIAGNOSIS — Z36.89 ENCOUNTER FOR ULTRASOUND TO ASSESS FETAL GROWTH: Primary | ICD-10-CM

## 2025-04-03 PROCEDURE — 76816 OB US FOLLOW-UP PER FETUS: CPT

## 2025-04-03 NOTE — NURSING NOTE
Patient presents to [facility] for RL2/UAR/NST at 31w3d due to FGR, GDMA1. Reports blood sugar values fasting <95 and 2 hr post prandial <120.  Positive fetal movement. Denies LOF, vaginal bleeding or cramping/contractions. SBAR given to M MD, see their note in Epic.

## 2025-04-10 ENCOUNTER — OFFICE VISIT (OUTPATIENT)
Dept: FAMILY MEDICINE | Facility: CLINIC | Age: 25
End: 2025-04-10
Payer: COMMERCIAL

## 2025-04-10 VITALS
WEIGHT: 110 LBS | BODY MASS INDEX: 20.77 KG/M2 | DIASTOLIC BLOOD PRESSURE: 62 MMHG | TEMPERATURE: 98 F | SYSTOLIC BLOOD PRESSURE: 95 MMHG | HEIGHT: 61 IN | RESPIRATION RATE: 22 BRPM | HEART RATE: 61 BPM | OXYGEN SATURATION: 96 %

## 2025-04-10 DIAGNOSIS — O24.410 DIET CONTROLLED GESTATIONAL DIABETES MELLITUS (GDM) IN THIRD TRIMESTER: ICD-10-CM

## 2025-04-10 DIAGNOSIS — Z34.93 ENCOUNTER FOR PREGNANCY RELATED EXAMINATION IN THIRD TRIMESTER: Primary | ICD-10-CM

## 2025-04-10 DIAGNOSIS — O36.5990 PREGNANCY AFFECTED BY FETAL GROWTH RESTRICTION: ICD-10-CM

## 2025-04-10 DIAGNOSIS — O36.1930 ANTI-M ISOIMMUNIZATION AFFECTING PREGNANCY IN THIRD TRIMESTER: ICD-10-CM

## 2025-04-10 LAB
BLD GP AB SCN SERPL QL: NEGATIVE
SPECIMEN EXP DATE BLD: NORMAL

## 2025-04-10 PROCEDURE — 86850 RBC ANTIBODY SCREEN: CPT

## 2025-04-10 PROCEDURE — 36415 COLL VENOUS BLD VENIPUNCTURE: CPT

## 2025-04-10 NOTE — PATIENT INSTRUCTIONS
Please go to this website to order a breast pump through your insurance:    [email or web address]

## 2025-04-10 NOTE — PROGRESS NOTES
"32 week OB visit    SUBJECTIVE:  Carson Staton is a  at 29w3d gestation by first trimester ultrasound who returns today for prenatal care. Estimated Date of Delivery: 2025     - Concerns this pregnancy:  GDMA1  - BG still stable, fastings in the 80's-90's, some 1 hr postprandials occasions as high as 135 usually, 2 hr postprandials are down into the 90's-100's  - feeling good, has changed to brown rice, otherwise doing well     Wt Readings from Last 5 Encounters:   04/10/25 49.9 kg (110 lb)   25 48.1 kg (106 lb)   25 46.7 kg (103 lb)   25 46.3 kg (102 lb)   25 46.3 kg (102 lb)     FGR  - seeing MFM, most recent ultrasound stable, now resolved and back to every 3 weeks  - no concerns today, growth is still small but overall doing well    Anti-M alloimmunization  - needs biweekly titers per [facility] recommendations  - otherwise no other symptoms    - Patient reports no vaginal bleeding, no contractions/severe cramping, no leakage of fluid. Contractions absent. Fetal movement is Present.   - No nausea/vomiting. No heartburn.   - No vaginal discharge. No dysuria.   - No headache, vision changes, lower extremity swelling, upper abdominal pain, chest pain, shortness of breath.   - Mood has been good.    Going to [facility]? Yes    Do you need help getting a car seat? No  Do you need help getting a breast pump? No      Carson Staton speaks Hmong so an  was used today.    OBJECTIVE:  BP 95/62 (BP Location: Right arm, Patient Position: Sitting, Cuff Size: Adult Regular)   Pulse 61   Temp 98  F (36.7  C)   Resp 22   Ht 1.549 m (5' 1\")   Wt 49.9 kg (110 lb)   SpO2 96%   BMI 20.78 kg/m    Const: No distress  Abd: Gravid.   See flowsheet for fundal height, FHTs, edema, cervical exam.    Labs today: No results found for any visits on 04/10/25.    ASSESSMENT/PLAN:  24 year old , 32w3d weeks of pregnancy with ALEXUS of 2025, by Ultrasound, presenting for prenatal " care    Carson was seen today for prenatal care.    Diagnoses and all orders for this visit:    Encounter for pregnancy related examination in third trimester  Overall doing well. Continues to go to [facility] for routine growth ultrasounds. Will follow-up with me in 2 weeks    Diet controlled gestational diabetes mellitus (GDM) in third trimester  Postprandials starting to creep up into 130's, though this is just per report and did not bring her log today. Encouraged food intake, likely will need insulin at the next visit. Plan for follow-up with me in 2 weeks, consider insulin and MTM at that time.    Anti-M isoimmunization affecting pregnancy in third trimester  Will send msg to [facility] regarding need for RhoGAM at delivery  -     Antibody titer red cell; Future    Pregnancy affected by fetal growth restriction  As above    Care and examination of lactating mother  -     Breast Pump Order for DME - ONLY FOR DME    -Tdap and flu shot this pregnancy: up to date.     - Patient will continue taking prenatal vitamins and avoiding cigarettes & alcohol.   -Breastfeeding education discussed. More details in Breastfeeding AVS    - Delivery plan completed with patient in the AVS.    -Patient recommended to see OB educator/RN today and/or future visit for education and/or care coordination.    - Return to clinic in 2 weeks.      Options for treatment and/or follow-up care were reviewed with the patient. Carson Staton was engaged and actively involved in the decision making process. She verbalized understanding of the options discussed and was satisfied with the final plan.      The longitudinal plan of care for the diagnosis(es)/condition(s) as documented were addressed during this visit. Due to the added complexity in care, I will continue to support Carson in the subsequent management and with ongoing continuity of care.    Precepted with Dr. Edgar Gusman MD

## 2025-04-10 NOTE — PROGRESS NOTES
Faculty Supervision of Residents   I have examined this patient and the medical care has been evaluated and discussed with the resident. See resident note outlining our discussion. Discussed risks of alloimmunization, GDM, and low wt gain.     Mandy Hernández MD

## 2025-04-22 ENCOUNTER — OFFICE VISIT (OUTPATIENT)
Dept: FAMILY MEDICINE | Facility: CLINIC | Age: 25
End: 2025-04-22
Payer: COMMERCIAL

## 2025-04-22 VITALS
DIASTOLIC BLOOD PRESSURE: 58 MMHG | OXYGEN SATURATION: 97 % | BODY MASS INDEX: 20.77 KG/M2 | RESPIRATION RATE: 20 BRPM | HEIGHT: 61 IN | SYSTOLIC BLOOD PRESSURE: 94 MMHG | TEMPERATURE: 98.5 F | WEIGHT: 110 LBS | HEART RATE: 74 BPM

## 2025-04-22 DIAGNOSIS — O36.5990 PREGNANCY AFFECTED BY FETAL GROWTH RESTRICTION: ICD-10-CM

## 2025-04-22 DIAGNOSIS — O36.1930 ANTI-M ISOIMMUNIZATION AFFECTING PREGNANCY IN THIRD TRIMESTER: ICD-10-CM

## 2025-04-22 DIAGNOSIS — O24.410 DIET CONTROLLED GESTATIONAL DIABETES MELLITUS (GDM) IN THIRD TRIMESTER: ICD-10-CM

## 2025-04-22 DIAGNOSIS — Z34.93 ENCOUNTER FOR PREGNANCY RELATED EXAMINATION IN THIRD TRIMESTER: Primary | ICD-10-CM

## 2025-04-22 LAB
BLD GP AB SCN SERPL QL: NEGATIVE
SPECIMEN EXP DATE BLD: NORMAL

## 2025-04-22 NOTE — PROGRESS NOTES
"34 week OB visit    SUBJECTIVE:  Carson Staton is a  at 34w1d gestation by first trimester ultrasound who returns today for prenatal care. Estimated Date of Delivery: 2025     - Concerns this pregnancy:  GDMA1  Per my last note on 4/10/2025:  Diet controlled gestational diabetes mellitus (GDM) in third trimester  Postprandials starting to creep up into 130's, though this is just per report and did not bring her log today. Encouraged food intake, likely will need insulin at the next visit. Plan for follow-up with me in 2 weeks, consider insulin and MTM at that time.    Today,   - fasting BG in the 80's  - postprandials as high as 137, eating mostly fruits and rice in the mornings but doing well  - no higher than that, feeling fine otherwise    FGR  - seeing MFM, most recent ultrasound stable, now resolved and back to every 3 weeks, next due in 2 days  - no concerns today, growth is still small but overall doing well     Anti-M alloimmunization  - needs biweekly titers per MFM recommendations  - otherwise no other symptoms    - Concerns today:     - Patient reports no vaginal bleeding, no contractions/severe cramping, no leakage of fluid. Contractions absent. Fetal movement is Present.   - No nausea/vomiting. No heartburn.   - No vaginal discharge. No dysuria.   - No headache, vision changes, lower extremity swelling, upper abdominal pain, chest pain, shortness of breath.   - Mood has been stable.    Going to [facility]? Yes    Do you need help getting a car seat? No  Do you need help getting a breast pump? No      Carson Staton speaks Hmong so an  was used today.    OBJECTIVE:  BP 94/58   Pulse 74   Temp 98.5  F (36.9  C) (Oral)   Resp 20   Ht 1.549 m (5' 1\")   Wt 49.9 kg (110 lb)   SpO2 97%   BMI 20.78 kg/m    Const: No distress  Abd: Gravid.   See flowsheet for fundal height, FHTs, edema, cervical exam.    Labs today: No results found for any visits on 25.    ASSESSMENT/PLAN:  24 " year old , 34w1d weeks of pregnancy with ALEXUS of 2025, by Ultrasound, confirmed by first trimester US.    Carson was seen today for prenatal care.    Diagnoses and all orders for this visit:    Encounter for pregnancy related examination in third trimester  Overall doing well. No contractions yet, good movement. Will continue to monitor closely.    Diet controlled gestational diabetes mellitus (GDM) in third trimester  Seems well controlled, fastings < 90 and 1 hr postprandials < 140. Will track again in 2 weeks.    Anti-M isoimmunization affecting pregnancy in third trimester  Due for redraw.  -     Antibody titer red cell; Future  -     Antibody titer red cell    Pregnancy affected by fetal growth restriction  Following with MFM. Going for ultrasound this week, will follow with recommendations.    - Prenatal labs reviewed.    -Tdap and flu shot this pregnancy: up to date.     - Patient will continue taking prenatal vitamins and avoiding cigarettes & alcohol.   -Breastfeeding education discussed. More details in Breastfeeding AVS  - Supporting a non-medicated birth  - Skin to Skin  - Non-separation of mother and baby    - Delivery plan completed with patient in the AVS.  -Patient recommended to see OB educator/RN today and/or future visit for education and/or care coordination.    - Return to clinic in 2 weeks.      Options for treatment and/or follow-up care were reviewed with the patient. Carson Staton was engaged and actively involved in the decision making process. She verbalized understanding of the options discussed and was satisfied with the final plan.    Precepted with Dr. Irina Gusman MD

## 2025-04-24 ENCOUNTER — OFFICE VISIT (OUTPATIENT)
Dept: MATERNAL FETAL MEDICINE | Facility: CLINIC | Age: 25
End: 2025-04-24
Attending: STUDENT IN AN ORGANIZED HEALTH CARE EDUCATION/TRAINING PROGRAM
Payer: COMMERCIAL

## 2025-04-24 ENCOUNTER — HOSPITAL ENCOUNTER (OUTPATIENT)
Dept: ULTRASOUND IMAGING | Facility: CLINIC | Age: 25
Discharge: HOME | End: 2025-04-24
Attending: STUDENT IN AN ORGANIZED HEALTH CARE EDUCATION/TRAINING PROGRAM
Payer: COMMERCIAL

## 2025-04-24 DIAGNOSIS — Z36.89 ENCOUNTER FOR ULTRASOUND TO ASSESS FETAL GROWTH: ICD-10-CM

## 2025-04-24 DIAGNOSIS — O24.410 DIET CONTROLLED GESTATIONAL DIABETES MELLITUS (GDM) IN THIRD TRIMESTER: ICD-10-CM

## 2025-04-24 DIAGNOSIS — O36.5990 PREGNANCY AFFECTED BY FETAL GROWTH RESTRICTION: Primary | ICD-10-CM

## 2025-04-24 PROCEDURE — 76816 OB US FOLLOW-UP PER FETUS: CPT

## 2025-04-24 NOTE — NURSING NOTE
Patient presents to [facility] for RL2 at 34w3d due to previous FGR this pregnancy. Reports blood sugar values fasting <95 and 1 hr post prandial <140.    Positive fetal movement. Denies LOF, vaginal bleeding or cramping/contractions. SBAR given to MARIA DEL ROSARIO MD, see their note in Epic.

## 2025-04-29 ENCOUNTER — TELEPHONE (OUTPATIENT)
Dept: FAMILY MEDICINE | Facility: CLINIC | Age: 25
End: 2025-04-29
Payer: COMMERCIAL

## 2025-04-29 NOTE — TELEPHONE ENCOUNTER
Forms/Letter Request    Type of form/letter: OTHER:        Do we have the form/letter: Yes: [employer] Certificate of Medical Necessity Form     Who is the form from? [employer]     Where did/will the form come from? form was faxed in    When is form/letter needed by: ASAP    How would you like the form/letter returned: Fax : [phone]    Patient Notified form requests are processed in 5-7 business days:No

## 2025-05-02 ENCOUNTER — OFFICE VISIT (OUTPATIENT)
Dept: FAMILY MEDICINE | Facility: CLINIC | Age: 25
End: 2025-05-02
Payer: COMMERCIAL

## 2025-05-02 VITALS
HEART RATE: 68 BPM | DIASTOLIC BLOOD PRESSURE: 65 MMHG | WEIGHT: 112.12 LBS | BODY MASS INDEX: 21.17 KG/M2 | SYSTOLIC BLOOD PRESSURE: 102 MMHG | HEIGHT: 61 IN | TEMPERATURE: 98.6 F

## 2025-05-02 DIAGNOSIS — O36.1930 ANTI-M ISOIMMUNIZATION AFFECTING PREGNANCY IN THIRD TRIMESTER: ICD-10-CM

## 2025-05-02 DIAGNOSIS — O36.5990 PREGNANCY AFFECTED BY FETAL GROWTH RESTRICTION: ICD-10-CM

## 2025-05-02 DIAGNOSIS — O24.410 DIET CONTROLLED GESTATIONAL DIABETES MELLITUS (GDM) IN THIRD TRIMESTER: ICD-10-CM

## 2025-05-02 DIAGNOSIS — Z34.93 ENCOUNTER FOR PREGNANCY RELATED EXAMINATION IN THIRD TRIMESTER: Primary | ICD-10-CM

## 2025-05-02 LAB
BLD GP AB SCN SERPL QL: NEGATIVE
SPECIMEN EXP DATE BLD: NORMAL

## 2025-05-02 PROCEDURE — 86850 RBC ANTIBODY SCREEN: CPT

## 2025-05-02 PROCEDURE — 99207 PR PRENATAL VISIT: CPT | Mod: GC

## 2025-05-02 PROCEDURE — 36415 COLL VENOUS BLD VENIPUNCTURE: CPT

## 2025-05-02 NOTE — PROGRESS NOTES
"OB 35 week visit    SUBJECTIVE:  Carson Staton is a  at 35w4d gestation by first trimester ultrasound who returns today for prenatal care. Estimated Date of Delivery: 2025      - Concerns this pregnancy:  GDMA1  Per my last note on 2025:  Diet controlled gestational diabetes mellitus (GDM) in third trimester  Seems well controlled, fastings < 90 and 1 hr postprandials < 140. Will track again in 2 weeks.  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  -     Antibody titer red cell; Future  -     Antibody titer red cell  - due for titers today     Pregnancy affected by fetal growth restriction  Following with MFM. Going for ultrasound this week, will follow with recommendations.  - last ultrasound with good measurements, EFW at 22%  - no longer concerned for now      - Concerns today: none  - Patient reports no vaginal bleeding, no contractions/severe cramping, no leakage of fluid. Contractions absent. Fetal movement is Present.   - No nausea/vomiting. No heartburn.   - No vaginal discharge. No dysuria.   - No headache, vision changes, lower extremity swelling, upper abdominal pain, chest pain, shortness of breath.   - Mood has been excellent.    Going to WIC? Yes    Do you need help getting a car seat? No  Do you need help getting a breast pump? No    Carson Staton speaks Hmong so an  was used today.    OBJECTIVE:  /65 (BP Location: Right arm, Patient Position: Sitting, Cuff Size: Child)   Pulse 68   Temp 98.6  F (37  C) (Oral)   Ht 1.545 m (5' 0.83\")   Wt 50.9 kg (112 lb 1.9 oz)   BMI 21.31 kg/m    Const: No distress  Abd: Gravid.   See flowsheet for fundal height, FHTs, edema, cervical exam.    Labs today: No results found for any visits on 25.    ASSESSMENT/PLAN:  24 year old , 35w4d weeks of pregnancy with ALEXUS of " 6/2/2025, by Ultrasound, at 35w4d weeks GA.    Carson was seen today for prenatal care.    Diagnoses and all orders for this visit:    Encounter for pregnancy related examination in third trimester  Overall doing well. EFW now 22%tile. Expectant management noted. Has been vertex presentation throughout based on [facility] ultrasounds, will plan for in-office evaluation for vertex at next visit. Follow-up 2 weeks.    Diet controlled gestational diabetes mellitus (GDM) in third trimester  Overall numbers showing good control. Forgot log today; will recheck in 2 weeks. Continue with diet control.    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 Overall has been negative, monitor for hemolytic anemia at birth.  -     Antibody Screen - Red Cell; Future  -     Antibody Screen - Red Cell    Pregnancy affected by fetal growth restriction  Now 22%tile, next follow-up 5/22/2025 with [facility]      -Tdap and flu shot this pregnancy: up to date.    - Counseled patient on topics as marked in the OB checklist.    - Patient will continue taking prenatal vitamins and avoiding cigarettes & alcohol.   -Breastfeeding education discussed. More details in Breastfeeding AVS  - Supporting a non-medicated birth  - Skin to Skin  - Non-separation of mother and baby  - Delivery plan completed with patient in the AVS.    -Patient recommended to see OB educator/RN today and/or future visit for education and/or care coordination.    - Return to clinic in 2 weeks.      Options for treatment and/or follow-up care were reviewed with the patient. Carson Staton was engaged and actively involved in the decision making process. She verbalized understanding of the options discussed and was satisfied with the final plan.    Precepted with Dr. Jericho Gusman MD

## 2025-05-10 NOTE — PROGRESS NOTES
Preceptor Attestation:   Patient seen, evaluated and discussed with the resident. I have verified the content of the note, which accurately reflects my assessment of the patient and the plan of care.    Supervising Physician:Elias Echavarria MD    [facility]

## 2025-05-12 ENCOUNTER — RESULTS FOLLOW-UP (OUTPATIENT)
Dept: FAMILY MEDICINE | Facility: CLINIC | Age: 25
End: 2025-05-12

## 2025-05-14 ENCOUNTER — OFFICE VISIT (OUTPATIENT)
Dept: FAMILY MEDICINE | Facility: CLINIC | Age: 25
End: 2025-05-14
Payer: COMMERCIAL

## 2025-05-14 ENCOUNTER — RESULTS FOLLOW-UP (OUTPATIENT)
Dept: FAMILY MEDICINE | Facility: CLINIC | Age: 25
End: 2025-05-14

## 2025-05-14 VITALS
OXYGEN SATURATION: 97 % | HEART RATE: 67 BPM | TEMPERATURE: 98.3 F | RESPIRATION RATE: 16 BRPM | HEIGHT: 61 IN | DIASTOLIC BLOOD PRESSURE: 62 MMHG | SYSTOLIC BLOOD PRESSURE: 100 MMHG | WEIGHT: 115.08 LBS | BODY MASS INDEX: 21.73 KG/M2

## 2025-05-14 DIAGNOSIS — O36.5990 PREGNANCY AFFECTED BY FETAL GROWTH RESTRICTION: ICD-10-CM

## 2025-05-14 DIAGNOSIS — R60.0 PERIPHERAL EDEMA: ICD-10-CM

## 2025-05-14 DIAGNOSIS — O24.410 DIET CONTROLLED GESTATIONAL DIABETES MELLITUS (GDM) IN THIRD TRIMESTER: ICD-10-CM

## 2025-05-14 DIAGNOSIS — O36.1930 ANTI-M ISOIMMUNIZATION AFFECTING PREGNANCY IN THIRD TRIMESTER: ICD-10-CM

## 2025-05-14 DIAGNOSIS — Z36.85 ANTENATAL SCREENING FOR STREPTOCOCCUS B: ICD-10-CM

## 2025-05-14 DIAGNOSIS — Z34.93 ENCOUNTER FOR PREGNANCY RELATED EXAMINATION IN THIRD TRIMESTER: Primary | ICD-10-CM

## 2025-05-14 LAB
BLD GP AB SCN SERPL QL: NEGATIVE
SPECIMEN EXP DATE BLD: NORMAL

## 2025-05-14 PROCEDURE — 36415 COLL VENOUS BLD VENIPUNCTURE: CPT

## 2025-05-14 PROCEDURE — 99207 PR PRENATAL VISIT: CPT | Mod: GC

## 2025-05-14 PROCEDURE — 86850 RBC ANTIBODY SCREEN: CPT

## 2025-05-14 PROCEDURE — T1013 SIGN LANG/ORAL INTERPRETER: HCPCS

## 2025-05-14 PROCEDURE — 87653 STREP B DNA AMP PROBE: CPT

## 2025-05-14 NOTE — PROGRESS NOTES
"37 week OB visit    SUBJECTIVE:  Carson Staton is a  at 37w2d gestation by first trimester ultrasound who returns today for prenatal care. Estimated Date of Delivery: 2025     Pregnancy complicated by:  GDMA1  Per my last note on 2025  Diet controlled gestational diabetes mellitus (GDM) in third trimester  Overall numbers showing good control. Forgot log today; will recheck in 2 weeks. Continue with diet control.  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 Overall has been negative, monitor for hemolytic anemia at birth.  -     Antibody Screen - Red Cell; Future  -     Antibody Screen - Red Cell  - due for titers today     Pregnancy affected by fetal growth restriction  Now 22%tile, next follow-up 2025 with MFM  - no longer concerned for now, MFM follow-up 2025    - Concerns today: none. Does have some increased leg swelling over the last 2 days but no other symptoms of headache, dizziness, migraines, or other symptoms  - Patient reports no vaginal bleeding, no contractions/severe cramping, no leakage of fluid. Contractions mild. Fetal movement is Present.   - No nausea/vomiting. No heartburn.   - No vaginal discharge. No dysuria.   - No headache, vision changes, lower extremity swelling, upper abdominal pain, chest pain, shortness of breath.   - Mood has been good.    Do you need help getting a car seat? No  Do you need help getting a breast pump? No      Carson Staton speaks Hmong so an  was used today.    OBJECTIVE:  /62   Pulse 67   Temp 98.3  F (36.8  C) (Oral)   Resp 16   Ht 1.55 m (5' 1.02\")   Wt 52.2 kg (115 lb 1.3 oz)   SpO2 97%   BMI 21.73 kg/m    Const: No distress  Abd: Gravid.   See flowsheet for fundal height, FHTs, edema, cervical exam.    Labs today: No results found for any visits on " 25.    ASSESSMENT/PLAN:  24 year old , 37w2d weeks of pregnancy with ALEXUS of 2025, by Ultrasound, at 37w2d weeks GA.    Carson was seen today for prenatal care.    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     - Patient will continue taking prenatal vitamins and avoiding cigarettes & alcohol.   - Breastfeeding education discussed. More details in Breastfeeding AVS  - Supporting a non-medicated birth  - Skin to Skin  - Non-separation of mother and baby  - Delivery plan completed with patient in the AVS.      - Return to clinic in 1 week.      Options for treatment and/or follow-up care were reviewed with the patient. Carson Staton was engaged and actively involved in the decision making process. She verbalized understanding of the options discussed and was satisfied with the final plan.    Precepted with Dr. Flynn Gusman MD

## 2025-05-15 LAB — GP B STREP DNA SPEC QL NAA+PROBE: NEGATIVE

## 2025-05-22 ENCOUNTER — OFFICE VISIT (OUTPATIENT)
Dept: MATERNAL FETAL MEDICINE | Facility: HOSPITAL | Age: 25
End: 2025-05-22
Attending: STUDENT IN AN ORGANIZED HEALTH CARE EDUCATION/TRAINING PROGRAM
Payer: COMMERCIAL

## 2025-05-22 ENCOUNTER — OFFICE VISIT (OUTPATIENT)
Dept: FAMILY MEDICINE | Facility: CLINIC | Age: 25
End: 2025-05-22
Payer: COMMERCIAL

## 2025-05-22 ENCOUNTER — ANCILLARY PROCEDURE (OUTPATIENT)
Dept: ULTRASOUND IMAGING | Facility: HOSPITAL | Age: 25
End: 2025-05-22
Attending: STUDENT IN AN ORGANIZED HEALTH CARE EDUCATION/TRAINING PROGRAM
Payer: COMMERCIAL

## 2025-05-22 VITALS
OXYGEN SATURATION: 97 % | HEART RATE: 83 BPM | TEMPERATURE: 98 F | BODY MASS INDEX: 22.09 KG/M2 | WEIGHT: 117 LBS | HEIGHT: 61 IN | SYSTOLIC BLOOD PRESSURE: 97 MMHG | DIASTOLIC BLOOD PRESSURE: 65 MMHG | RESPIRATION RATE: 18 BRPM

## 2025-05-22 DIAGNOSIS — Z36.89 ENCOUNTER FOR ULTRASOUND TO ASSESS FETAL GROWTH: ICD-10-CM

## 2025-05-22 DIAGNOSIS — Z34.93 ENCOUNTER FOR PREGNANCY RELATED EXAMINATION IN THIRD TRIMESTER: Primary | ICD-10-CM

## 2025-05-22 DIAGNOSIS — O36.1930 ANTI-M ISOIMMUNIZATION AFFECTING PREGNANCY IN THIRD TRIMESTER: ICD-10-CM

## 2025-05-22 DIAGNOSIS — O36.5990 PREGNANCY AFFECTED BY FETAL GROWTH RESTRICTION: ICD-10-CM

## 2025-05-22 DIAGNOSIS — O24.410 DIET CONTROLLED GESTATIONAL DIABETES MELLITUS (GDM) IN THIRD TRIMESTER: ICD-10-CM

## 2025-05-22 DIAGNOSIS — O36.5930 POOR FETAL GROWTH AFFECTING MANAGEMENT OF MOTHER IN SINGLETON PREGNANCY IN THIRD TRIMESTER: Primary | ICD-10-CM

## 2025-05-22 PROCEDURE — 36415 COLL VENOUS BLD VENIPUNCTURE: CPT

## 2025-05-22 PROCEDURE — 99212 OFFICE O/P EST SF 10 MIN: CPT | Mod: 25 | Performed by: STUDENT IN AN ORGANIZED HEALTH CARE EDUCATION/TRAINING PROGRAM

## 2025-05-22 PROCEDURE — T1013 SIGN LANG/ORAL INTERPRETER: HCPCS

## 2025-05-22 PROCEDURE — 76816 OB US FOLLOW-UP PER FETUS: CPT | Mod: 26 | Performed by: STUDENT IN AN ORGANIZED HEALTH CARE EDUCATION/TRAINING PROGRAM

## 2025-05-22 PROCEDURE — 99207 PR PRENATAL VISIT: CPT | Mod: GC

## 2025-05-22 PROCEDURE — 76816 OB US FOLLOW-UP PER FETUS: CPT

## 2025-05-22 NOTE — PROGRESS NOTES
The patient was seen for an ultrasound in the [facility] today.  For a detailed report of the ultrasound examination, please see the ultrasound report which can be found under the imaging tab.     If you have questions regarding today's evaluation or if we can be of further service, please contact the [facility]Fetal Medicine Center.    Geovanna Ocasio MD  Maternal Fetal Medicine

## 2025-05-22 NOTE — NURSING NOTE
Patient presents to [facility] for follow up ultrasound. Evaluated for labor, vaginal bleeding, signs of infection, pre-eclampsia, rupture of membranes without concerns. Evaluated for fetal wellbeing, patient reports normal fetal movement. Evaluated for gestational diabetes concerns, patient reports blood glucose values fasting 70s-80s and 1 hour postprandial 130s. Education provided to patient on when to call primary team for signs of labor. SBAR given to MFM MD, see their note in Epic. Plan for no further follow up with [facility].

## 2025-05-22 NOTE — PROGRESS NOTES
"38 week OB visit    SUBJECTIVE:  Lambert Finn is a  at 38w3d gestation by first trimester  who returns today for prenatal care. Estimated Date of Delivery: 2025    Previous concerns this pregnancy:  GDMA1  - numbers continue to be within range, only one postprandial at 141 but otherwise well controlled    Anti-M isoimmunization  - will redraw titers today, baby at risk for hemolytic disease of the , previous results have been negative    Pregnancy affected by FGR  - most recent growth ultrasound this morning showing vertex presentation and EFW 19%tile, stable      - Concerns today: none  - Patient reports no vaginal bleeding, no contractions/severe cramping, no leakage of fluid. Contractions minimal. Fetal movement is Present.   - No nausea/vomiting. No heartburn.   - No vaginal discharge. No dysuria.   - No headache, vision changes, lower extremity swelling, upper abdominal pain, chest pain, shortness of breath.   - Mood has been stable.      Lambert Finn speaks Hmong so an  was used today.    OBJECTIVE:  BP 97/65 (BP Location: Right arm, Patient Position: Sitting, Cuff Size: Adult Regular)   Pulse 83   Temp 98  F (36.7  C)   Resp 18   Ht 1.549 m (5' 1\")   Wt 53.1 kg (117 lb)   SpO2 97%   BMI 22.11 kg/m    Const: No distress  Abd: Gravid.   See flowsheet for fundal height, FHTs, edema, cervical exam.    Labs today:   Results for orders placed or performed in visit on 25   [facility] Comprehensive Single F/U     Status: None    Narrative            Comp Follow Up  ---------------------------------------------------------------------------------------------------------  Pat. Name: LAMBERT FINN       Study Date:  2025 8:05am  Pat. NO:  [id number]        Referring  MD: KWASI PADILLA  Site:         Sonographer: Karla Lovett RDMS  :  [id number]        Age:   " 24  ---------------------------------------------------------------------------------------------------------    INDICATION  ---------------------------------------------------------------------------------------------------------  Reassess fetal growth - previous fetal growth restriction (FGR)  Gestational Diabetes (GDM) - Diet controlled      METHOD  ---------------------------------------------------------------------------------------------------------  Transabdominal ultrasound examination. View: Sufficient      PREGNANCY  ---------------------------------------------------------------------------------------------------------  Chang pregnancy. Number of fetuses: 1      DATING  ---------------------------------------------------------------------------------------------------------                                           Date                                Details                                                                                      Gest. age                      ALEXUS  LMP                                  8/4/2024                                                                                                                           41 w + 4 d                     5/11/2025  Previous U/S                      11/15/2024                       GA, GA 11 w + 4 d                                                                     38 w + 3 d                     6/2/2025  U/S                                   5/22/2025                         based upon AC, BPD, Femur, HC                                                37 w + 0 d                     6/12/2025  Assigned dating                  based on ultrasound (GA), selected on 04/24/2025                                                              38 w + 3 d                     6/2/2025      GENERAL EVALUATION  ---------------------------------------------------------------------------------------------------------  Cardiac activity present. FHR  159 bpm. Fetal movements: present. Presentation: cephalic  Placenta: Anterior  Umbilical cord: 3 vessel cord  Amniotic fluid: Amount of AF: normal. MVP 4.8 cm      FETAL BIOMETRY  ---------------------------------------------------------------------------------------------------------  BPD                                                         94.8                    mm                         38w 5d                                                Hadlock  OFD                                                         114.3                  mm                         35w 5d                                                 Nicolaides  HC                                                           334.6                  mm                         38w 2d                                                 Hadlock  AC                                                           325.0                  mm                         36w 3d                      16%                    Hadlock  Femur                                                      67.6                    mm                         34w 5d                                                 Hadlock  Humerus                                                   60.2                   mm                          35w 0d                                                Michael  Fetal Weight Calculation:  EFW                                                        2,937                  g                                                            19%                     Hadlock  EFW (lb,oz)                                              6 lb 8                  oz  EFW by                                                     Hadlock (BPD-HC-AC-FL)      FETAL ANATOMY  ---------------------------------------------------------------------------------------------------------  The following structures appear normal:  Head / Neck                         Cranium. Head size. Head shape. Midline falx.  Cavum septi pellucidi. Thalami.  Face                                   Lips. Profile. Nose.  Heart / Thorax                      4-chamber view. RVOT view. LVOT view. 3-vessel-trachea view.                                             Diaphragm.  Abdomen                             Stomach. Kidneys. Bladder.    The following structures were documented previously:  Head / Neck                         Lateral ventricles. Cerebellum. Cisterna magna.  Spine                                  Cervical spine. Thoracic spine. Lumbar spine. Sacral spine.    Fetal sex: female.      MATERNAL STRUCTURES  ---------------------------------------------------------------------------------------------------------  Cervix                                  Suboptimal  Right Ovary                          Not examined  Left Ovary                            Not examined      RECOMMENDATION  ---------------------------------------------------------------------------------------------------------  Thank-you for referring your patient for ultrasound assessment.    I discussed the findings on today's ultrasound with the patient. Continued normal growth.    Further ultrasounds as clinically indicated.    Return to primary provider for continued prenatal care.    If you have questions regarding today's evaluation or if we can be of further service, please contact the [facility].    **Fetal anomalies may be present but not detected**    I spent a total of 10 minutes (excluding the ultrasound interpretation) on the date of this encounter including preparing to see the patient (reviewing medical records/tests),  in direct face-to-face contact with the patient during the visit counseling and discussing the plan of care and documenting the visit in the electronic medical record.        Impression    IMPRESSION  ---------------------------------------------------------------------------------------------------------  1. Chang  pregnancy at 38w 3d gestational age.  2. None of the anomalies commonly detected by ultrasound were evident in the limited fetal anatomic survey as described above.  3. Growth parameters and estimated fetal weight were appropriate for gestational age.  4. The amniotic fluid volume appeared normal.           ASSESSMENT/PLAN:  24 year old , 38w3d weeks of pregnancy with ALEXUS of 2025, by Ultrasound at 38 weeks    Carson was seen today for prenatal care.    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 Results have overall been negative, likely does not need additional titers.  -     Antibody titer red cell; Future  -     Antibody titer red cell    Diet controlled gestational diabetes mellitus (GDM) in third trimester  - Well controlled with diet  - Plan to monitor next week    Pregnancy affected by fetal growth restriction  - EFW 19th %tile per report    -Tdap and flu shot this pregnancy: up to date.    - Patient will continue taking prenatal vitamins and avoiding cigarettes & alcohol.   -Breastfeeding education discussed. More details in Breastfeeding AVS  - Delivery plan completed with patient in the AVS.    -Patient recommended to see OB educator/RN today and/or future visit for education and/or care coordination.    - Return to clinic in 1 week.      Options for treatment and/or follow-up care were reviewed with the patient. Carson Staton was engaged and  actively involved in the decision making process. She verbalized understanding of the options discussed and was satisfied with the final plan.    Precepted with Dr. Barbara Gusman MD

## 2025-05-23 ENCOUNTER — RESULTS FOLLOW-UP (OUTPATIENT)
Dept: FAMILY MEDICINE | Facility: CLINIC | Age: 25
End: 2025-05-23

## 2025-05-23 LAB
BLOOD BANK CHART COMMENT: NORMAL
SPECIMEN EXP DATE BLD: NORMAL

## 2025-05-27 ENCOUNTER — OFFICE VISIT (OUTPATIENT)
Dept: FAMILY MEDICINE | Facility: CLINIC | Age: 25
End: 2025-05-27
Payer: COMMERCIAL

## 2025-05-27 VITALS
SYSTOLIC BLOOD PRESSURE: 96 MMHG | BODY MASS INDEX: 22.3 KG/M2 | HEART RATE: 68 BPM | OXYGEN SATURATION: 98 % | WEIGHT: 118 LBS | DIASTOLIC BLOOD PRESSURE: 60 MMHG

## 2025-05-27 DIAGNOSIS — Z34.93 ENCOUNTER FOR PREGNANCY RELATED EXAMINATION IN THIRD TRIMESTER: Primary | ICD-10-CM

## 2025-05-27 DIAGNOSIS — O36.1930 ANTI-M ISOIMMUNIZATION AFFECTING PREGNANCY IN THIRD TRIMESTER: ICD-10-CM

## 2025-05-27 DIAGNOSIS — O24.410 DIET CONTROLLED GESTATIONAL DIABETES MELLITUS (GDM) IN THIRD TRIMESTER: ICD-10-CM

## 2025-05-27 NOTE — PROGRESS NOTES
39 week OB visit    SUBJECTIVE:  Carson Staton is a  at 39w1d gestation by first trimester who returns today for prenatal care. Estimated Date of Delivery: 2025     Previous concerns this pregnancy:  GDMA1  - BG log brought in today, no values over 140 or lower than 70  - feeling great, no concerns    FGR  - now resolved, EFW 19th%tile at last visit continue to monitor    Anti-M alloimmunization  Per lab message on last value:  Blood Bank Chart Comment BB Chart Comment   Comment: Anti-M IgG antibodies may cause hemolytic disease of the fetus and . Anti-M IgM antibodies are NOT clinically significant. The blood bank used a pre-warming technique to distinguish IgG and IgM reactivity and found that this patient has only IgM reactivity. Because this patient has an anti-M antibody that is NOT clinically significant, we have not titered the antibody.   - infant will likely need monitoring for hyperbilirubinemia at birth (bili at 12 hours, TEJ typing despite blood type B positive)  - otherwise will reconsider titers at next visit as these were drawn last week    - Concerns today: none  - Patient reports no vaginal bleeding, no contractions/severe cramping, no leakage of fluid. Contractions minimal to absent. Fetal movement is Present.   - No nausea/vomiting. No heartburn.   - No vaginal discharge. No dysuria.   - No headache, vision changes, lower extremity swelling, upper abdominal pain, chest pain, shortness of breath.   - Mood has been good.    Carson Staton speaks Hmong so an  was used today.    OBJECTIVE:  BP 96/60   Pulse 68   Wt 53.5 kg (118 lb)   SpO2 98%   BMI 22.30 kg/m    Const: No distress  Abd: Gravid.   See flowsheet for fundal height, FHTs, edema, cervical exam.    Labs today: No results found for any visits on 25.    ASSESSMENT/PLAN:  24 year old , 39w1d weeks of pregnancy with ALEXUS of 2025, by Ultrasound.      Carson was seen today for prenatal  care.    Diagnoses and all orders for this visit:    Encounter for pregnancy related examination in third trimester  Overall doing well. Deferred cervical check today and membrane stripping, plan for Monday next week to induce labor given poor contraction pattern. If not delivered by 41 weeks recommend induction.    Diet controlled gestational diabetes mellitus (GDM) in third trimester  BG well controlled with diet. No concerns otherwise.    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 Baby will need labs at birth to check for hyperbilirubinemia, consider check for anemia.      - Counseled patient on topics as marked in the OB checklist.    - Patient will continue taking prenatal vitamins and avoiding cigarettes & alcohol.   -Breastfeeding education discussed. More details in Breastfeeding AVS  - Delivery plan completed with patient in the AVS.    -Patient recommended to see OB educator/RN today and/or future visit for education and/or care coordination.    - Return to clinic in 1 week. Plan to do cervical check and strip membranes at next visit.     Options for treatment and/or follow-up care were reviewed with the patient. Carson Staton was engaged and actively involved in the decision making process. She verbalized understanding of the options discussed and was satisfied with the final plan.    Precepted with Dr. Flynn Gusman MD

## 2025-06-02 ENCOUNTER — OFFICE VISIT (OUTPATIENT)
Dept: FAMILY MEDICINE | Facility: CLINIC | Age: 25
End: 2025-06-02
Payer: COMMERCIAL

## 2025-06-02 VITALS
RESPIRATION RATE: 22 BRPM | DIASTOLIC BLOOD PRESSURE: 62 MMHG | HEIGHT: 61 IN | HEART RATE: 67 BPM | BODY MASS INDEX: 22.28 KG/M2 | WEIGHT: 118 LBS | TEMPERATURE: 97 F | SYSTOLIC BLOOD PRESSURE: 98 MMHG | OXYGEN SATURATION: 100 %

## 2025-06-02 DIAGNOSIS — O36.1930 ANTI-M ISOIMMUNIZATION AFFECTING PREGNANCY IN THIRD TRIMESTER: ICD-10-CM

## 2025-06-02 DIAGNOSIS — Z34.93 ENCOUNTER FOR PREGNANCY RELATED EXAMINATION IN THIRD TRIMESTER: Primary | ICD-10-CM

## 2025-06-02 DIAGNOSIS — O24.410 DIET CONTROLLED GESTATIONAL DIABETES MELLITUS (GDM) IN THIRD TRIMESTER: ICD-10-CM

## 2025-06-02 NOTE — PROGRESS NOTES
Preceptor Attestation:   Patient seen, evaluated and discussed with the resident. I have verified the content of the note, which accurately reflects my assessment of the patient and the plan of care.    Supervising Physician:Addie Bell MD    [facility]

## 2025-06-02 NOTE — PROGRESS NOTES
"40 week OB visit    SUBJECTIVE:  Carson Staton is a  at 40w0d gestation by first trimester ultrasound who returns today for prenatal care. 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 Baby will need labs at birth to check for hyperbilirubinemia, consider check for anemia.  - will plan for check during likely induction next week    - Concerns today: none  - Patient reports no vaginal bleeding, no contractions/severe cramping, no leakage of fluid. Contractions sporadic, not very strong. Fetal movement is Present.   - No nausea/vomiting. No heartburn.   - No vaginal discharge. No dysuria.   - No headache, vision changes, lower extremity swelling, upper abdominal pain, chest pain, shortness of breath.   - Mood has been good.      Carson Staton speaks Hmong so an  was used today.    OBJECTIVE:  BP 98/62   Pulse 67   Temp 97  F (36.1  C)   Resp 22   Ht 1.549 m (5' 1\")   Wt 53.5 kg (118 lb)   SpO2 100%   BMI 22.30 kg/m    Const: No distress  Abd: Gravid.   See flowsheet for fundal height, FHTs, edema, cervical exam.    Labs today: No results found for any visits on 25.    ASSESSMENT/PLAN:  24 year old , 40w0d weeks of pregnancy with ALEXUS of 2025, by Ultrasound, at 40w0d    Carson was seen today for prenatal care.    Diagnoses and all orders for this visit:    Encounter for pregnancy related examination in third trimester  Doing well overall. Cervical exam notable for soft cervix, 1cm at best, vertex presentation confirmed on bedside ultrasound. I was able to strip her membranes today, counseled on mild spotting and possible cramping. Did  on signs and symptoms of labor " today as well, will plan to induce next week at 41w0d if not already delivered.    Diet controlled gestational diabetes mellitus (GDM) in third trimester  Well controlled since last week per report. Forgot her log today, will need BG monitoring at birth.    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 Plan to evaluate baby for  hyperbilirubinemia early after birth.      - Tdap and flu shot this pregnancy: up to date.    - Counseled patient on topics as marked in the OB checklist.    - Patient will continue taking prenatal vitamins and avoiding cigarettes & alcohol.   -Breastfeeding education discussed.     - Delivery plan completed with patient in the AVS.    -Patient recommended to see OB educator/RN today and/or future visit for education and/or care coordination.    - Plan for induction in 1 week at [facility]     Options for treatment and/or follow-up care were reviewed with the patient. Carson Palaciosigor was engaged and actively involved in the decision making process. She verbalized understanding of the options discussed and was satisfied with the final plan.    Precepted with Dr. Arabella Gusman MD

## 2025-06-04 NOTE — PROGRESS NOTES
Faculty Supervision of Residents   I have examined this patient and the medical care has been evaluated and discussed with the resident. See resident note outlining our discussion.      Yumiko Pruett MD

## 2025-06-05 ENCOUNTER — TELEPHONE (OUTPATIENT)
Dept: FAMILY MEDICINE | Facility: CLINIC | Age: 25
End: 2025-06-05

## 2025-06-05 ENCOUNTER — VIRTUAL VISIT (OUTPATIENT)
Dept: INTERPRETER SERVICES | Facility: CLINIC | Age: 25
End: 2025-06-05

## 2025-06-05 ENCOUNTER — HOSPITAL ENCOUNTER (INPATIENT)
Facility: HOSPITAL | Age: 25
End: 2025-06-05
Attending: FAMILY MEDICINE | Admitting: FAMILY MEDICINE
Payer: COMMERCIAL

## 2025-06-05 VITALS
DIASTOLIC BLOOD PRESSURE: 73 MMHG | TEMPERATURE: 98.1 F | SYSTOLIC BLOOD PRESSURE: 122 MMHG | RESPIRATION RATE: 18 BRPM | OXYGEN SATURATION: 100 %

## 2025-06-05 DIAGNOSIS — Z3A.40 40 WEEKS GESTATION OF PREGNANCY: Primary | ICD-10-CM

## 2025-06-05 PROBLEM — Z36.89 ENCOUNTER FOR TRIAGE IN PREGNANT PATIENT: Status: ACTIVE | Noted: 2025-06-05

## 2025-06-05 PROBLEM — Z34.90 PREGNANCY: Status: ACTIVE | Noted: 2025-06-05

## 2025-06-05 LAB
ABO + RH BLD: NORMAL
BLD GP AB SCN SERPL QL: NEGATIVE
BLD PROD TYP BPU: NORMAL
BLD PROD TYP BPU: NORMAL
BLOOD COMPONENT TYPE: NORMAL
BLOOD COMPONENT TYPE: NORMAL
CODING SYSTEM: NORMAL
CODING SYSTEM: NORMAL
CROSSMATCH: NORMAL
CROSSMATCH: NORMAL
ERYTHROCYTE [DISTWIDTH] IN BLOOD BY AUTOMATED COUNT: 13.3 % (ref 10–15)
EST. AVERAGE GLUCOSE BLD GHB EST-MCNC: 103 MG/DL
GLUCOSE BLDC GLUCOMTR-MCNC: 137 MG/DL (ref 70–99)
GLUCOSE BLDC GLUCOMTR-MCNC: 140 MG/DL (ref 70–99)
GLUCOSE BLDC GLUCOMTR-MCNC: 90 MG/DL (ref 70–99)
GLUCOSE BLDC GLUCOMTR-MCNC: 92 MG/DL (ref 70–99)
GLUCOSE BLDC GLUCOMTR-MCNC: 94 MG/DL (ref 70–99)
HBA1C MFR BLD: 5.2 %
HCT VFR BLD AUTO: 32.7 % (ref 35–47)
HGB BLD-MCNC: 10.4 G/DL (ref 11.7–15.7)
MCH RBC QN AUTO: 26.3 PG (ref 26.5–33)
MCHC RBC AUTO-ENTMCNC: 31.8 G/DL (ref 31.5–36.5)
MCV RBC AUTO: 83 FL (ref 78–100)
PLATELET # BLD AUTO: 171 10E3/UL (ref 150–450)
RBC # BLD AUTO: 3.95 10E6/UL (ref 3.8–5.2)
SPECIMEN EXP DATE BLD: NORMAL
T PALLIDUM AB SER QL: NONREACTIVE
UNIT ABO/RH: NORMAL
UNIT ABO/RH: NORMAL
UNIT NUMBER: NORMAL
UNIT NUMBER: NORMAL
UNIT STATUS: NORMAL
UNIT STATUS: NORMAL
UNIT TYPE ISBT: 7300
UNIT TYPE ISBT: 7300
WBC # BLD AUTO: 7.5 10E3/UL (ref 4–11)

## 2025-06-05 PROCEDURE — 120N000001 HC R&B MED SURG/OB

## 2025-06-05 PROCEDURE — 250N000011 HC RX IP 250 OP 636: Performed by: ANESTHESIOLOGY

## 2025-06-05 PROCEDURE — 250N000011 HC RX IP 250 OP 636: Mod: JZ

## 2025-06-05 PROCEDURE — 83036 HEMOGLOBIN GLYCOSYLATED A1C: CPT

## 2025-06-05 PROCEDURE — 85041 AUTOMATED RBC COUNT: CPT

## 2025-06-05 PROCEDURE — 00HU33Z INSERTION OF INFUSION DEVICE INTO SPINAL CANAL, PERCUTANEOUS APPROACH: ICD-10-PCS | Performed by: ANESTHESIOLOGY

## 2025-06-05 PROCEDURE — 258N000003 HC RX IP 258 OP 636

## 2025-06-05 PROCEDURE — 3E0R3BZ INTRODUCTION OF ANESTHETIC AGENT INTO SPINAL CANAL, PERCUTANEOUS APPROACH: ICD-10-PCS | Performed by: ANESTHESIOLOGY

## 2025-06-05 PROCEDURE — T1013 SIGN LANG/ORAL INTERPRETER: HCPCS | Mod: U4,TEL,95

## 2025-06-05 PROCEDURE — 99207 PR NO CHARGE LOS: CPT | Mod: GC | Performed by: FAMILY MEDICINE

## 2025-06-05 PROCEDURE — 36415 COLL VENOUS BLD VENIPUNCTURE: CPT

## 2025-06-05 PROCEDURE — 86922 COMPATIBILITY TEST ANTIGLOB: CPT

## 2025-06-05 PROCEDURE — 85018 HEMOGLOBIN: CPT

## 2025-06-05 PROCEDURE — 86901 BLOOD TYPING SEROLOGIC RH(D): CPT

## 2025-06-05 PROCEDURE — 86780 TREPONEMA PALLIDUM: CPT

## 2025-06-05 RX ORDER — FENTANYL CITRATE 50 UG/ML
50 INJECTION, SOLUTION INTRAMUSCULAR; INTRAVENOUS EVERY 30 MIN PRN
Refills: 0 | Status: DISPENSED | OUTPATIENT
Start: 2025-06-05

## 2025-06-05 RX ORDER — TRANEXAMIC ACID 10 MG/ML
1 INJECTION, SOLUTION INTRAVENOUS EVERY 30 MIN PRN
Status: ACTIVE | OUTPATIENT
Start: 2025-06-05

## 2025-06-05 RX ORDER — DEXTROSE MONOHYDRATE 100 MG/ML
INJECTION, SOLUTION INTRAVENOUS CONTINUOUS PRN
Status: ACTIVE | OUTPATIENT
Start: 2025-06-05

## 2025-06-05 RX ORDER — DEXTROSE MONOHYDRATE 25 G/50ML
25-50 INJECTION, SOLUTION INTRAVENOUS
Status: ACTIVE | OUTPATIENT
Start: 2025-06-05

## 2025-06-05 RX ORDER — KETOROLAC TROMETHAMINE 15 MG/ML
15 INJECTION, SOLUTION INTRAMUSCULAR; INTRAVENOUS
Status: ACTIVE | OUTPATIENT
Start: 2025-06-05

## 2025-06-05 RX ORDER — TERBUTALINE SULFATE 1 MG/ML
0.25 INJECTION SUBCUTANEOUS
Status: ACTIVE | OUTPATIENT
Start: 2025-06-05

## 2025-06-05 RX ORDER — FENTANYL/ROPIVACAINE/NS/PF 2MCG/ML-.1
PLASTIC BAG, INJECTION (ML) EPIDURAL
Refills: 0 | Status: DISPENSED | OUTPATIENT
Start: 2025-06-05

## 2025-06-05 RX ORDER — METOCLOPRAMIDE 10 MG/1
10 TABLET ORAL EVERY 6 HOURS PRN
Status: ACTIVE | OUTPATIENT
Start: 2025-06-05

## 2025-06-05 RX ORDER — METHYLERGONOVINE MALEATE 0.2 MG/ML
200 INJECTION INTRAVENOUS
Status: ACTIVE | OUTPATIENT
Start: 2025-06-05

## 2025-06-05 RX ORDER — ONDANSETRON 2 MG/ML
4 INJECTION INTRAMUSCULAR; INTRAVENOUS EVERY 6 HOURS PRN
Status: ACTIVE | OUTPATIENT
Start: 2025-06-05

## 2025-06-05 RX ORDER — NICOTINE POLACRILEX 4 MG
15-30 LOZENGE BUCCAL
Status: ACTIVE | OUTPATIENT
Start: 2025-06-05

## 2025-06-05 RX ORDER — PROCHLORPERAZINE MALEATE 10 MG
10 TABLET ORAL EVERY 6 HOURS PRN
Status: ACTIVE | OUTPATIENT
Start: 2025-06-05

## 2025-06-05 RX ORDER — OXYTOCIN/0.9 % SODIUM CHLORIDE 30/500 ML
340 PLASTIC BAG, INJECTION (ML) INTRAVENOUS CONTINUOUS PRN
Status: ACTIVE | OUTPATIENT
Start: 2025-06-05

## 2025-06-05 RX ORDER — METOCLOPRAMIDE HYDROCHLORIDE 5 MG/ML
10 INJECTION INTRAMUSCULAR; INTRAVENOUS EVERY 6 HOURS PRN
Status: ACTIVE | OUTPATIENT
Start: 2025-06-05

## 2025-06-05 RX ORDER — OXYTOCIN/0.9 % SODIUM CHLORIDE 30/500 ML
100-340 PLASTIC BAG, INJECTION (ML) INTRAVENOUS CONTINUOUS PRN
Status: ACTIVE | OUTPATIENT
Start: 2025-06-05

## 2025-06-05 RX ORDER — LOPERAMIDE HYDROCHLORIDE 2 MG/1
2 CAPSULE ORAL
Status: ACTIVE | OUTPATIENT
Start: 2025-06-05

## 2025-06-05 RX ORDER — CITRIC ACID/SODIUM CITRATE 334-500MG
30 SOLUTION, ORAL ORAL
Status: ACTIVE | OUTPATIENT
Start: 2025-06-05

## 2025-06-05 RX ORDER — OXYTOCIN 10 [USP'U]/ML
10 INJECTION, SOLUTION INTRAMUSCULAR; INTRAVENOUS
Status: ACTIVE | OUTPATIENT
Start: 2025-06-05

## 2025-06-05 RX ORDER — LIDOCAINE 40 MG/G
CREAM TOPICAL
Status: DISCONTINUED | OUTPATIENT
Start: 2025-06-05 | End: 2025-06-05 | Stop reason: HOSPADM

## 2025-06-05 RX ORDER — NALOXONE HYDROCHLORIDE 0.4 MG/ML
0.2 INJECTION, SOLUTION INTRAMUSCULAR; INTRAVENOUS; SUBCUTANEOUS
Status: ACTIVE | OUTPATIENT
Start: 2025-06-05

## 2025-06-05 RX ORDER — NALOXONE HYDROCHLORIDE 0.4 MG/ML
0.4 INJECTION, SOLUTION INTRAMUSCULAR; INTRAVENOUS; SUBCUTANEOUS
Status: ACTIVE | OUTPATIENT
Start: 2025-06-05

## 2025-06-05 RX ORDER — LOPERAMIDE HYDROCHLORIDE 2 MG/1
4 CAPSULE ORAL
Status: ACTIVE | OUTPATIENT
Start: 2025-06-05

## 2025-06-05 RX ORDER — SODIUM CHLORIDE 9 MG/ML
INJECTION, SOLUTION INTRAVENOUS CONTINUOUS PRN
Status: ACTIVE | OUTPATIENT
Start: 2025-06-05

## 2025-06-05 RX ORDER — MISOPROSTOL 200 UG/1
800 TABLET ORAL
Status: ACTIVE | OUTPATIENT
Start: 2025-06-05

## 2025-06-05 RX ORDER — FENTANYL CITRATE-0.9 % NACL/PF 10 MCG/ML
100 PLASTIC BAG, INJECTION (ML) INTRAVENOUS EVERY 5 MIN PRN
Status: DISPENSED | OUTPATIENT
Start: 2025-06-05

## 2025-06-05 RX ORDER — ACETAMINOPHEN 325 MG/1
650 TABLET ORAL EVERY 4 HOURS PRN
Status: ACTIVE | OUTPATIENT
Start: 2025-06-05

## 2025-06-05 RX ORDER — CARBOPROST TROMETHAMINE 250 UG/ML
250 INJECTION, SOLUTION INTRAMUSCULAR
Status: ACTIVE | OUTPATIENT
Start: 2025-06-05

## 2025-06-05 RX ORDER — SODIUM CHLORIDE, SODIUM LACTATE, POTASSIUM CHLORIDE, CALCIUM CHLORIDE 600; 310; 30; 20 MG/100ML; MG/100ML; MG/100ML; MG/100ML
INJECTION, SOLUTION INTRAVENOUS CONTINUOUS
Status: ACTIVE | OUTPATIENT
Start: 2025-06-05

## 2025-06-05 RX ORDER — KETOROLAC TROMETHAMINE 15 MG/ML
15 INJECTION, SOLUTION INTRAMUSCULAR; INTRAVENOUS
Status: DISPENSED | OUTPATIENT
Start: 2025-06-05

## 2025-06-05 RX ORDER — NALBUPHINE HYDROCHLORIDE 20 MG/ML
2.5-5 INJECTION INTRAMUSCULAR; INTRAVENOUS; SUBCUTANEOUS EVERY 6 HOURS PRN
Status: ACTIVE | OUTPATIENT
Start: 2025-06-05

## 2025-06-05 RX ORDER — IBUPROFEN 800 MG/1
800 TABLET, FILM COATED ORAL
Status: ACTIVE | OUTPATIENT
Start: 2025-06-05

## 2025-06-05 RX ORDER — MISOPROSTOL 200 UG/1
400 TABLET ORAL
Status: ACTIVE | OUTPATIENT
Start: 2025-06-05

## 2025-06-05 RX ORDER — ONDANSETRON 4 MG/1
4 TABLET, ORALLY DISINTEGRATING ORAL EVERY 6 HOURS PRN
Status: ACTIVE | OUTPATIENT
Start: 2025-06-05

## 2025-06-05 RX ADMIN — FENTANYL CITRATE 50 MCG: 50 INJECTION INTRAMUSCULAR; INTRAVENOUS at 18:53

## 2025-06-05 RX ADMIN — FENTANYL CITRATE 50 MCG: 50 INJECTION INTRAMUSCULAR; INTRAVENOUS at 19:38

## 2025-06-05 RX ADMIN — Medication: at 20:54

## 2025-06-05 RX ADMIN — SODIUM CHLORIDE, SODIUM LACTATE, POTASSIUM CHLORIDE, AND CALCIUM CHLORIDE 500 ML: .6; .31; .03; .02 INJECTION, SOLUTION INTRAVENOUS at 20:40

## 2025-06-05 ASSESSMENT — ACTIVITIES OF DAILY LIVING (ADL)
ADLS_ACUITY_SCORE: 15
ADLS_ACUITY_SCORE: 41
ADLS_ACUITY_SCORE: 15

## 2025-06-05 NOTE — PROGRESS NOTES
Received report from Lynne Campuzano, RNC    Carson is doing well.  Spontaneous labor.  Pregnancy complicated with diet controlled gestational diabetes and FGR, now resolved. Contractions started around 0300.  3/80/-2 on admission, per charting.  Rating contraction pain 2/10.  Contractions every 2-5 palpating mild to moderate.  EFM cat 1 with accelerations. Discussed PIV recommendation with GDM.  Patient is consents to IV.  Discussed repeating cervical exam.  Carson declines at this time.  Discussed the benefit of upright laboring and position changes. She is eager to try new things. Dr. Martinez to bedside and updated.  He will place early labor GDM orders.  iPad interperter used for morning assessment.    Carson has been on the labor ball, walking in hallways, standing and swaying.  Increased bloody show noted.  Now rating pain as a 6/10 with faces scale.  Coping well.  Declining pharmacological pain interventions at this time.  Boyfriend Malvin at the bedside and has been very supportive.

## 2025-06-05 NOTE — PROGRESS NOTES
Fasting blood sugar not done as patient has been eating through the night.  Discussed UA has bloody show, order for UA discontinued.

## 2025-06-05 NOTE — PROGRESS NOTES
Dr. Waggoner at bedside for rounding.  Discussed timing of next SVE.  Carson would like to wait as long as possible before next SVE.  Planning for noon unless indicated sooner.

## 2025-06-05 NOTE — PROGRESS NOTES
Patient doing well, FHT Category 1. Good contraction pattern. Doing stretches, walking. Discussed that next cervical check around 4 pm, possible AROM at that time pending exam.    Plan:  - Continue routine cares  - Anticipate     Updated Dr. Uzma Martinez MD

## 2025-06-05 NOTE — PLAN OF CARE
"Care Plan Progress Note      Name: Carson Staton  : 2000  MRN: [id number]    VS: /67   Temp 98.2  F (36.8  C)   Resp 18   SpO2 98%   Patient arrived to birthplace with c/o bloody show and \"diarrhea cramps\".  Tobi q2-5, moderate palpation.  /-2.  Moved to room 7, admission completed with  on ipad.           Lynne Campuzano RN  2025  6:51 AM      "

## 2025-06-05 NOTE — PROGRESS NOTES
Data: Carson Staton a 25 year old  40w3d presented to Birthplace: 2025  4:15 AM.  Reason for maternal/fetal assessment is bloody show. Patient reports uterine contractions and vaginal bleeding. VSS. Fetal movement present. Patient denies leaking of vaginal fluid/rupture of membranes, pelvic pressure, nausea, vomiting, headache, visual disturbances, epigastric or RUQ pain, significant edema. Support person is present.   Action: Verbal consent for EFM and monitors placed upon arrival. Triage assessment completed. Bill of rights reviewed.  Response: Category 1 FHR. MD updated to above. Patient verbalized agreement with plan.  Lynne Campuzano RN  2025 4:46 AM

## 2025-06-05 NOTE — PROGRESS NOTES
Labor Progress Note    Subjective:  Carson Staton is a 25 year old yo  who was admitted for bloody show, contractions.      Overall she is feeling well this morning, feeling contractions. Still seeing bloody show, no LOF. Denies any headaches, vision changes, shortness of breath, right upper quadrant abdominal pain.    Objective:  Temp:  [98.2  F (36.8  C)] 98.2  F (36.8  C)  Resp:  [18-24] 24  BP: (103-113)/(59-67) 103/59  SpO2:  [98 %] 98 %  Gen: no acute distress, resting comfortably   CV: acyanotic   Pulm: unlabored respirations   Abd: gravid, soft, nontender   Extremities: soft, nontender, 1+ pitting edema BLE      Fetal assessment:              Heart Rate baseline: 130              Variability: moderate               FHR Category: Category 1   Cervical exam:              Dilation: 3 cm              Effacement: 80-90%              Station: -2  Uterine activity:              Contraction frequency: every 3-5 minutes              Contraction duration:  seconds     New labs results include: n/a    Impression:  Carson Staton is a 25 year old year old at 40w3d weeks who presented with bloody show, contractions q5min. No LOF, headache, CP, SOB, worsening peripheral edema or significant sx of pre-E. Pregnancy complicated by well controlled GDMA1, FGR (resolved), anti-M alloimmunization.      Blood type B POS  GBS negative   Rubella immune  HIV negative  Hepatitis B, syphilis non-reactive    Plan:  1. Labor considerations:   - Cat  1 tracing  - Continue routine management, no ripening/augmentation indicated at this time   - Anticipate   - Recheck cervix around noon  - Open to IV fentanyl for pain management  - Does not want epidural   - Updated Dr. Gusman who is planning to take over management once active  labor    2. GDMA1  - A1c 5.2 on admission, spot check 103  - Early labor order set completed  - Post prandial glucose TID, goal <140      3. Fetal considerations:  Per review of most recent  prenatal record w/Dr. 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 Baby will need labs at birth to check for hyperbilirubinemia, consider check for anemia.    4. Postpartum planning:  Breast and formula feeding  Contraception: considering patch    Will Martinez MD  PGY-1 [facility]    6/5/2025, 8:00 AM      Precepted patient with Dr. Jahaira Waggoner.

## 2025-06-05 NOTE — H&P
[facility]    History and Physical  Obstetrics and Gynecology     Date of Admission:  2025    Assessment & Plan   Carson Staton is a 25 year old  at 40w3d who presents with bloody show, contractions q5min. No LOF, headache, CP, SOB, worsening peripheral edema or significant sx of pre-E. Pregnancy complicated by well controlled GDMA1, FGR (resolved), anti-M alloimmunization. Fetal movement is appropriate, FHT category I    Per review of most recent prenatal record w/Dr. 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 Baby will need labs at birth to check for hyperbilirubinemia, consider check for anemia.    ASSESSMENT:   IUP @ 40w3d in early labor.  FHT  Category  I    PLAN:   Admit - see IP orders  Anticipate         History of Present Illness   Carson Staton is a 25 year old female  at 40w3d by dating U/S is admitted to the Birthplace in early labor.    PRENATAL COURSE  Prenatal course was complicated by    Patient Active Problem List    Diagnosis Date Noted    Encounter for triage in pregnant patient 2025     Priority: Medium    Pregnancy 2025     Priority: Medium    Gestational diabetes mellitus (GDM) in second trimester 2025     Priority: Medium    Pregnancy affected by fetal growth restriction 2025     Priority: Medium    Anti-M isoimmunization affecting pregnancy in second trimester 2025     Priority: Medium         Prenatal labs notable for:GDMA1 well controlled, FGR (resolved), anti-M alloimmunization   Blood type B POS  GBS negative   Rubella immune  HIV negative  Hepatitis B, syphilis non-reactive    Past Medical History    I have reviewed this patient's medical history and updated it with pertinent information if needed.   Past Medical History:   Diagnosis Date    Diabetes (H)        Past Surgical History   I have reviewed this patient's surgical history and updated  it with pertinent information if needed.  History reviewed. No pertinent surgical history.    Prior to Admission Medications   Prior to Admission Medications   Prescriptions Last Dose Informant Patient Reported? Taking?   Prenatal Vit-Fe Fumarate-FA (PRENATAL MULTIVITAMIN W/IRON) 27-0.8 MG tablet 6/5/2025 Morning  No Yes   Sig: Take 1 tablet by mouth daily.   blood glucose (NO BRAND SPECIFIED) lancets standard 6/4/2025  No Yes   Sig: Use to test blood sugar 4 times daily or as directed.   blood glucose (NO BRAND SPECIFIED) test strip 6/4/2025  No Yes   Sig: Use to test blood sugar 4 times daily or as directed.   blood glucose monitoring (NO BRAND SPECIFIED) meter device kit 6/4/2025  No Yes   Sig: Use to test blood sugar 4 times daily or as directed.   omeprazole (PRILOSEC) 20 MG DR capsule Unknown  No No   Sig: Take 1 capsule (20 mg) by mouth daily.   ondansetron (ZOFRAN ODT) 4 MG ODT tab Unknown  No No   Sig: Take 1 tablet (4 mg) by mouth every 8 hours as needed for nausea   polyethylene glycol (MIRALAX) 17 GM/Dose powder Unknown  No No   Sig: Take 17 g by mouth daily.   vitamin B6 (PYRIDOXINE) 50 MG TABS Unknown  No No   Sig: Take 2 tablets (100 mg) by mouth daily as needed.      Facility-Administered Medications: None     Allergies   Allergies   Allergen Reactions    Blood-Group Specific Substance Other (See Comments)     Patient has a history of a clinically antibody against RBC antigens.  A delay in compatible RBCs may occur.  Anti M identified at [facility]       Social History   I have reviewed this patient's social history and updated it with pertinent information if needed. Carson Staton  reports that she has never smoked. She has never been exposed to tobacco smoke. She has never used smokeless tobacco. She reports that she does not drink alcohol and does not use drugs.    Family History   I have reviewed this patient's family history and updated it with pertinent information if needed.   History reviewed.  No pertinent family history.    Immunization History   TDAP  given this pregnancy.  Influenza vaccine  given this pregnancy.    Physical Exam   Temp: 98.2  F (36.8  C)   BP: 113/67     Resp: 18 SpO2: 98 %      Vital Signs with Ranges  Temp:  [98.2  F (36.8  C)] 98.2  F (36.8  C)  Resp:  [18] 18  BP: (113)/(67) 113/67  SpO2:  [98 %] 98 %    Abdomen: gravid, single vertex fetus, non-tender  Constitutional: healthy, alert  Respiratory: No increased work of breathing, good air exchange.  Cardiovascular: RRR    Fetal assessment:              Heart Rate baseline: 125              Variability: moderate    Accelerations: present   Decelerations: absent              FHR Category: Category I  Cervical exam:              Dilation: 3 cm              Effacement: 80-90%              Station: -2   Uterine activity:              Contraction frequency: every 5-10 minutes              Contraction duration: 45-60 seconds      Will Moyer MD  PGY-2  [facility] Medicine Residency  [facility]  June 5, 2025

## 2025-06-05 NOTE — TELEPHONE ENCOUNTER
Forms/Letter Request    Type of form/letter: OTHER: CERTIFICATE OF MEDICAL NECESSITY       Do we have the form/letter: Yes:     Who is the form from? [employer]    Where did/will the form come from? form was faxed in    When is form/letter needed by:     How would you like the form/letter returned: Fax : [phone]    Patient Notified form requests are processed in 5-7 business days:    Okay to leave a detailed message?:

## 2025-06-05 NOTE — PROGRESS NOTES
Brief progress note:    Updated cervical exam at noon per RN .  Notable change to station with increasing cervical dilation.  Patient continuing to be uncomfortable and feeling contractions, currently working on positional techniques. FHT reviewed, category 1.     Plan:  -Continue routine cares  -Anticipate   -Limited cervical checks per patient    Discussed with Dr. Uzma Martinez MD

## 2025-06-05 NOTE — PLAN OF CARE
Problem: Labor  Goal: Effective Progression to Delivery  Outcome: Progressing   Goal Outcome Evaluation:  Carson has continued to get more uncomfortable through the day.  Increased bloody show.  Prefers limited vaginal exams.  Continue to support physiologic labor.

## 2025-06-05 NOTE — PROGRESS NOTES
Cervical exam per RN 4.5/90/0 to +1 station, much softer. Category 1 tracing, contraction q3-4 mins.     Plan:  - Next cervical check 1800, possible ArOM at that time    Updated Dr. Uzma Martinez MD

## 2025-06-05 NOTE — PROGRESS NOTES
Carson is coping well but working hard with labor.  Using position changes, counter pressure, tub, and comb squeeze to cope.  She would like to have her cervix checked again around 1800, with possible AROM.

## 2025-06-06 LAB — GLUCOSE BLDC GLUCOMTR-MCNC: 85 MG/DL (ref 70–99)

## 2025-06-06 PROCEDURE — 120N000001 HC R&B MED SURG/OB

## 2025-06-06 PROCEDURE — 59400 OBSTETRICAL CARE: CPT | Mod: GC

## 2025-06-06 PROCEDURE — 0UQGXZZ REPAIR VAGINA, EXTERNAL APPROACH: ICD-10-PCS

## 2025-06-06 PROCEDURE — 10907ZC DRAINAGE OF AMNIOTIC FLUID, THERAPEUTIC FROM PRODUCTS OF CONCEPTION, VIA NATURAL OR ARTIFICIAL OPENING: ICD-10-PCS

## 2025-06-06 PROCEDURE — 250N000013 HC RX MED GY IP 250 OP 250 PS 637

## 2025-06-06 PROCEDURE — 250N000009 HC RX 250

## 2025-06-06 PROCEDURE — 722N000001 HC LABOR CARE VAGINAL DELIVERY SINGLE

## 2025-06-06 PROCEDURE — 0KQM0ZZ REPAIR PERINEUM MUSCLE, OPEN APPROACH: ICD-10-PCS

## 2025-06-06 RX ORDER — CARBOPROST TROMETHAMINE 250 UG/ML
250 INJECTION, SOLUTION INTRAMUSCULAR
Status: DISCONTINUED | OUTPATIENT
Start: 2025-06-06 | End: 2025-06-07 | Stop reason: HOSPADM

## 2025-06-06 RX ORDER — IBUPROFEN 800 MG/1
800 TABLET, FILM COATED ORAL EVERY 6 HOURS PRN
Status: DISCONTINUED | OUTPATIENT
Start: 2025-06-06 | End: 2025-06-07 | Stop reason: HOSPADM

## 2025-06-06 RX ORDER — METHYLERGONOVINE MALEATE 0.2 MG/ML
200 INJECTION INTRAVENOUS
Status: DISCONTINUED | OUTPATIENT
Start: 2025-06-06 | End: 2025-06-07 | Stop reason: HOSPADM

## 2025-06-06 RX ORDER — POLYETHYLENE GLYCOL 3350 17 G/17G
17 POWDER, FOR SOLUTION ORAL DAILY PRN
Status: DISCONTINUED | OUTPATIENT
Start: 2025-06-06 | End: 2025-06-07 | Stop reason: HOSPADM

## 2025-06-06 RX ORDER — MISOPROSTOL 200 UG/1
800 TABLET ORAL
Status: DISCONTINUED | OUTPATIENT
Start: 2025-06-06 | End: 2025-06-07 | Stop reason: HOSPADM

## 2025-06-06 RX ORDER — BISACODYL 10 MG
10 SUPPOSITORY, RECTAL RECTAL DAILY PRN
Status: DISCONTINUED | OUTPATIENT
Start: 2025-06-06 | End: 2025-06-07 | Stop reason: HOSPADM

## 2025-06-06 RX ORDER — ACETAMINOPHEN 325 MG/1
650 TABLET ORAL EVERY 4 HOURS PRN
Status: DISCONTINUED | OUTPATIENT
Start: 2025-06-06 | End: 2025-06-07 | Stop reason: HOSPADM

## 2025-06-06 RX ORDER — AMOXICILLIN 250 MG
2 CAPSULE ORAL
Status: DISCONTINUED | OUTPATIENT
Start: 2025-06-06 | End: 2025-06-07 | Stop reason: HOSPADM

## 2025-06-06 RX ORDER — LOPERAMIDE HYDROCHLORIDE 2 MG/1
2 CAPSULE ORAL
Status: DISCONTINUED | OUTPATIENT
Start: 2025-06-06 | End: 2025-06-07 | Stop reason: HOSPADM

## 2025-06-06 RX ORDER — OXYTOCIN 10 [USP'U]/ML
10 INJECTION, SOLUTION INTRAMUSCULAR; INTRAVENOUS
Status: DISCONTINUED | OUTPATIENT
Start: 2025-06-06 | End: 2025-06-07 | Stop reason: HOSPADM

## 2025-06-06 RX ORDER — OXYTOCIN/0.9 % SODIUM CHLORIDE 30/500 ML
340 PLASTIC BAG, INJECTION (ML) INTRAVENOUS CONTINUOUS PRN
Status: DISCONTINUED | OUTPATIENT
Start: 2025-06-06 | End: 2025-06-07 | Stop reason: HOSPADM

## 2025-06-06 RX ORDER — MISOPROSTOL 200 UG/1
400 TABLET ORAL
Status: DISCONTINUED | OUTPATIENT
Start: 2025-06-06 | End: 2025-06-07 | Stop reason: HOSPADM

## 2025-06-06 RX ORDER — TRANEXAMIC ACID 10 MG/ML
1 INJECTION, SOLUTION INTRAVENOUS EVERY 30 MIN PRN
Status: DISCONTINUED | OUTPATIENT
Start: 2025-06-06 | End: 2025-06-07 | Stop reason: HOSPADM

## 2025-06-06 RX ORDER — LOPERAMIDE HYDROCHLORIDE 2 MG/1
4 CAPSULE ORAL
Status: DISCONTINUED | OUTPATIENT
Start: 2025-06-06 | End: 2025-06-07 | Stop reason: HOSPADM

## 2025-06-06 RX ORDER — HYDROCORTISONE 25 MG/G
CREAM TOPICAL 3 TIMES DAILY PRN
Status: DISCONTINUED | OUTPATIENT
Start: 2025-06-06 | End: 2025-06-07 | Stop reason: HOSPADM

## 2025-06-06 RX ADMIN — ACETAMINOPHEN 650 MG: 325 TABLET ORAL at 09:03

## 2025-06-06 RX ADMIN — IBUPROFEN 800 MG: 800 TABLET ORAL at 23:15

## 2025-06-06 RX ADMIN — Medication 340 ML/HR: at 01:25

## 2025-06-06 RX ADMIN — METHYLCELLULOSE 1000 MG: 500 TABLET ORAL at 09:03

## 2025-06-06 RX ADMIN — IBUPROFEN 800 MG: 800 TABLET ORAL at 03:40

## 2025-06-06 RX ADMIN — IBUPROFEN 800 MG: 800 TABLET ORAL at 15:54

## 2025-06-06 RX ADMIN — BENZOCAINE AND LEVOMENTHOL: 200; 5 SPRAY TOPICAL at 14:39

## 2025-06-06 ASSESSMENT — ACTIVITIES OF DAILY LIVING (ADL)
ADLS_ACUITY_SCORE: 21
ADLS_ACUITY_SCORE: 19
ADLS_ACUITY_SCORE: 15
ADLS_ACUITY_SCORE: 21
ADLS_ACUITY_SCORE: 19
ADLS_ACUITY_SCORE: 21
ADLS_ACUITY_SCORE: 19
ADLS_ACUITY_SCORE: 15
ADLS_ACUITY_SCORE: 21
ADLS_ACUITY_SCORE: 21
ADLS_ACUITY_SCORE: 19
ADLS_ACUITY_SCORE: 15
ADLS_ACUITY_SCORE: 21
ADLS_ACUITY_SCORE: 19
ADLS_ACUITY_SCORE: 21
ADLS_ACUITY_SCORE: 15
ADLS_ACUITY_SCORE: 19

## 2025-06-06 NOTE — LACTATION NOTE
This note was copied from a baby's chart.  Initial Lactation Visit    A [employer]  was utilized for visit.     Current age: 10 hours old    Gestational age at delivery: 40w4d     Breastfeeding goals:  6-12 months    Past breastfeeding experience: No, prime    Maternal risk that may affect breastfeeding: GDM    Home Pump: Medela MaxFlow    Breast assessment: Breast: Round, Symmetrical, Firm, and Tender, Nipples: Everted, Intact, Short, and Tender    Infant oral assessment: Oral: Midline and Elevated, Suck: Moderate and Uncoordinated    Feeding assessment: A feeding attempt was made, however,  was disinterested in feeding. Mother was assisted with hand expression and  was spoon/finger fed approximately 2 ml of colostrum.     Visit Summary: A [employer]  was utilized via ipad.  Discussed feeding plan below. Hand expression demonstrated. Benefits of skin-to-skin reviewed. Encouragement, reassurance, and positive reinforcement given.     Feeding plan:     Offer breast every 2-3 hours or sooner if baby is showing hunger cues  Massage breast to encourage milk flow   Strive for a deep and comfortable latch (Should feel like a tugging at the nipple)   Positioning reminders:  Line up baby's nose to nipple   Baby's chin should be tilted into the breast tissue and baby's nose gentle touching the breast   Ear, shoulder, hip, nice straight line   Chin of baby should not be resting on the baby's chest.   Your thumb lined up like baby's mustache, fingers under breast like a baby's beard  Baby's cheeks should be touching breast equally on both sides   Switch sides when swallows slow, baby pauses lengthen and  breast compressions do not increase the swallows or activity at the breast     Education:   [x] [place] Feeding Patterns in the First Few Days/second Night  [] Maternal Risk Factors that Could Affect Milk Supply  [x] Hand Expression  [x] Stages of Milk Production  [x] Feeding Cues  [x] Rousing  Techniques  [x] Benefits of Skin-to-Skin   [x] Breastfeeding Positions  [x] Tips to Maintain a Deep Latch     [x] Nutritive vs Non-Nutritive Sucking   [x] Gentle Breast Compressions  []  Weight Loss  [] How to Know When Baby is Getting Enough to Eat  [] Supplementation & methods (including Paced Bottle Feeding)  [] Nipple Shield  [x] Sore Nipples  [] Pacifier Use  [] Tight Frenulum  [] Pumping Plan/Flange fit and comfort  [] Breast pump Education  [] Engorgement/Reverse Pressure Softening  [x] Inpatient Lactation Support  [] Outpatient Lactation Resources/Follow up    Follow up: LC will continue to follow up during hospital stay.

## 2025-06-06 NOTE — PROGRESS NOTES
D:  Patient admitted to [facility]28/[id number] via wheelchair with  and support person Malvin.  A:  Bedside report received from Iwona HASSAN Oriented patient and family to surroundings; call light within reach. 4 Part ID bands double checked with reporting RN.  R:  Patient and  tolerated transfer. Care assumed.    Syeda Hurd RN on 2025 at 4:57 AM

## 2025-06-06 NOTE — PLAN OF CARE
"Patient's vitals and maternal assessments WDL. Declined IV Toradol at delivery.  once with assistance from RN. Patient is attentive to infant needs, asking appropriate questions and holds infant frequently.     Problem: Adult Inpatient Plan of Care  Goal: Optimal Comfort and Wellbeing  2025 by Iwona Cotton, RN  Outcome: Progressing  2025 by Iwona Cotton, RN  Outcome: Progressing  2025 by Iwona Cotton, RN  Outcome: Progressing  Intervention: Provide Person-Centered Care  Recent Flowsheet Documentation  Taken 2025 193 by Iwona Cotton, RN  Trust Relationship/Rapport:   care explained   choices provided   emotional support provided   empathic listening provided   questions answered   questions encouraged   reassurance provided   thoughts/feelings acknowledged     Problem: Postpartum (Vaginal Delivery)  Goal: Successful Parent Role Transition  Outcome: Progressing   Goal Outcome Evaluation:      Plan of Care Reviewed With: patient, significant other    Overall Patient Progress: improvingOverall Patient Progress: improving    Outcome Evaluation:  of baby girl \"Rylee\". VSS.          "

## 2025-06-06 NOTE — L&D DELIVERY NOTE
Delivery Summary  [facility]  Date of Service: 2025    Name      Carson Staton         2000  MRN       [id number]  PCP        Niurka Degroot at [facility], [phone].    DELIVERY NARRATIVE    On 2025 Carson Staton delivered a viable female infant at 40w4d with apgars of 8 and 9 via Vaginal, Spontaneous Delivery.    Carson presented to [facility]aternity Care on 2025 for assessment of bloody show.     Her group B Strep (GBS) carrier status was negative. She received nothing for induction, AROM for augmentation. She received IV fentanyl, nitrous oxide, and an epidural for analgesia.    Delivery was via vaginal, spontaneous delivery to a sterile field under nitrous oxide; intravenous fentanyl, and epidural anesthesia. Infant delivered in vertex left occiput anterior position. Shoulders delivered without difficulty. The baby was placed on the patient's abdomen. Cord complications: loose nuchal, which was immediately reduced. Delayed cord clamping was performed. 3 vessels were noted.     Placenta delivered at 2025  1:29 AM. Placenta was noted to be intact. Fundal massage performed and fundus found to be firm. The following uterotonics were given: Pitocin (IV). Perineum, vagina, cervix were inspected, and the following lacerations were noted: 2nd degree perineal and vaginal. Lacerations were repaired in the usual fashion using 3-0 Vicryl. QBL: 274 mL.    Excellent hemostasis was noted. Needle and sponge count correct. Infant and patient in delivery room in good and stable condition.   _________________    GA: 40w4d  GP:   Labor Complications: None  Additional Complications:    QBL: 149 mL  Delivery Type: Vaginal, Spontaneous  Duration of Ruptured Membranes: 6h 41m  [place] Weight: 3.11 kg (6 lb 13.7 oz)  Apgar scores: 8 , 9         Brionna Female-Carson [[id number]]      Labor Event Times      Latent labor onset  date/time: 2025 03:00    Active labor onset date: 25 Onset time:  8:24 PM   Dilation complete date: 25 Complete time: 11:34 PM   Start pushing date/time: 2025 23:49          Labor Events     labor?: No  Labor Type: Spontaneous, Augmentation, AROM  Predominate monitoring during 1st stage: continuous electronic fetal monitoring       Rupture date/time: 25 18:43   Rupture type: Artificial Rupture of Membranes  Fluid color: Clear, Meconium  Fluid odor: Normal     Augmentation: AROM  Augmentation date/time: 25 18:43   Indications for augmentation: Ineffective Contraction Pattern       Delivery/Placenta Date and Time      Delivery Date: 25 Delivery Time:  1:24 AM   Placenta Date/Time: 2025  1:29 AM  Oxytocin given at the time of delivery: after delivery of baby  Delivering clinician: Niurka Gusman MD   Other personnel present at delivery:  Provider Role   Iwona Cotton RN Registered Nurse   Dilcia Escobar DO MD Thor, Choua, RN Esselman, Kimberly A, RN Frederick, Angela, RN              Vaginal Counts       Initial count performed by 2 team members:  Two Team Members   amber cotton md         Needles Suture Needles Sponges (RETIRED) Instruments   Initial counts 0 0 5    Added to count 0 1 0    Relief counts       Final counts               Placed during labor Accounted for at the end of labor   FSE     IUPC     Cervidil No NA                             Apgars    Living status: Living   1 Minute 5 Minute 10 Minute 15 Minute 20 Minute   Skin color: 0  1       Heart rate: 2  2       Reflex irritability: 2  2       Muscle tone: 2  2       Respiratory effort: 2  2       Total: 8  9       Apgars assigned by: MAREN RODRIGUEZ RN       Cord      Vessels: 3 Vessels    Cord Complications: Nuchal   Nuchal Intervention: reduced         Nuchal cord description: loose nuchal cord         Cord Blood Disposition: Lab    Gases Sent?: No    Delayed cord clamping?: Yes    Cord Clamping  "Delay (seconds):  seconds           Mills Resuscitation    Methods: None  Output in Delivery Room: Stool        Measurements      Weight: 6 lb 13.7 oz Length: 1' 7.75\"     Head circumference: 33 cm    Output in delivery room: Stool       Skin to Skin and Feeding Plan      Skin to skin initiation date/time: 1841    Skin to skin with: Mother  Skin to skin end date/time:            Labor Events and Shoulder Dystocia    Fetal Tracing Prior to Delivery: Category 1  Shoulder dystocia present?: Neg       Delivery (Maternal) (Provider to Complete) (455345)    Episiotomy: None  Perineal lacerations: 2nd Repaired?: Yes     Vaginal laceration?: Yes Repaired?: Yes   Repair suture: 3-0 Vicryl  Number of repair packets: 1  Genital tract inspection done: Pos       Blood Loss  Mother: Carson Staton #[id number]     Start of Mother's Information      Delivery Blood Loss   Intrapartum & Postpartum: 25 - 25    Delivery Admission: 25 0415 - 25 0214         Intrapartum & Postpartum Delivery Admission    Delivery QBL (mL) Hospital Encounter 274 mL 274 mL    Total  274 mL 274 mL               End of Mother's Information  Mother: Carson Staton #[id number]                Delivery - Provider to Complete ([id number])    Delivering clinician: Niurka Gusman MD  Delivery Type (Choose the 1 that will go to the Birth History): Vaginal, Spontaneous                         Other personnel:  Provider Role   Iwona Cotton RN Registered Nurse   Dilcia Escobar DO MD Thor, Choua, RN Esselman, Kimberly A, RN Frederick, Angela, RN                     Placenta    Date/Time: 2025  1:29 AM  Removal: Spontaneous  Disposition: Patient possesion             Anesthesia    Method: Nitrous Oxide, INTRAVENOUS , Epidural     Analgesic:  BIRTH HISTORY: ANALGESIC   FENTANYL CITRATE 100 MCG/2ML IV SOSY   FENTANYL 2 MCG/ML ROPIVACAINE 0.1% IN  ML EPIDURAL CADD CNR                 " Presentation and Position    Presentation: Vertex    Position: Left Occiput Anterior                   Precepted patient with Dr. Dilcia Escobar, who was present for the entire delivery and laceration repair.        Niurka Gusman MD  [facility]  Pager: [phone]  6/6/2025 2:12 AM   used: Not needed.

## 2025-06-06 NOTE — LACTATION NOTE
"This note was copied from a baby's chart.  Follow up Lactation Visit    Current age:  13 hours old    Feeding assessment:  LC returned to pt room for the most recent feeding. A fair latch was achieved with a nipple shield applied.  needed encouragement/stimulation to stay active at breast. Suck inconsistent and uncoordinated.     Visit Summary: Best positions for a deep and comfortable latch were reviewed. Mother was assisted with cross-cradle on the left side and \"football\" hold on the right. Techniques to keep  active, including deep breast compression, was demonstrated. Parents stated that they would like to supplement with formula if, if needed. The plan below was discussed. Pumping after feedings was encouraged if  receives a supplement of formula (a supplement = a pumping session).     Feeding Plan:   Feed every 2-3 hours.   Keep breastfeeding efficient. If baby does not latch within 5-10 minutes, sleepy at breast, or not transferring milk then end the breastfeeding attempt and supplement   Positioning reminders:  line up baby's nose to nipple   ear, shoulder, hip, nice straight line   chin off bay's chest; chin touching your breast prior to latch  your thumb lined up like baby's mustache, fingers under breast like a baby's beard  cheeks touching breast  Signs of milk transfer:   Rhythmic sucking and swallowing  Comfortable latch with strong pulls  Meeting output goals (PNCB Page 44)   Supplement/Pump  Supplement using breastmilk, human donor milk and/or formula after every breastfeeding attempt. Follow guideline below for volumes and give more as baby cues.   0-48 hours 5-15 ml each feeding  48-72 hours 15-30 ml each feeding  72-96 hours 30-60 ml each feeding  Pump for 15-20 minutes to promote and stimulate milk productions       Care Plan for Nipple Shield Use    How to use:  Wash in warm, soapy water. Moisten the shield with water to help stick to skin.  Invert the nipple shield   way " inside out and place over nipple.   the wings and stretch the shield, while pressuring the shield onto the breast.  Latch baby deeply. Baby's lips should reach the flat base with entire nipple in baby's mouth.  The shield should fit comfortably without pinching.    Watch for:  Rhythmic sucking and swallowing.  Content baby after feeding.  Adequate wet & dirty diapers (per feeding log).  If baby not meeting above goals, pump breasts for 15 minutes to stimulate milk supply.    Education:   [x]  Feeding Patterns in the First Few Days/second Night   [] Maternal Risk Factors that Could Affect Milk Supply  [x] Hand Expression  [x] Stages of Milk Production  [x] Feeding Cues  [x] LBW Feeding Behaviors  [x] Rousing Techniques  [x] Benefits of Skin to Skin  [x] Breastfeeding Positions  [x] Tips to Maintain a Deep Latch  [x] Nutritive vs Non-Nutritive Sucking   [x] Gentle Breast Compressions  [] Harrison Weight Loss  [x] How to Know When Baby is Getting Enough to Eat  [x] Supplementation & Methods (including Paced Bottle Feeding)  [x] Type of Supplement  [x] Nipple Shield  [x] Sore Nipples  [] Pacifier Use  [] Tight Frenulum  [] Breastfeeding Twins  [] Pumping Plan  [] Breastpump Education  [] Engorgement/Reverse Pressure Softening  [x] Inpatient Lactation Support  [] Outpatient Lactation Resources/Follow up    Follow up: LC will continue to follow up during hospital stay.

## 2025-06-06 NOTE — PROGRESS NOTES
Brief progress note  8:43 PM    Re-evaluated at bedside  Now at 6-7cm/90%/0  Not tolerating pain very well, requesting epidural  Discussed risks and benefits, patient agreeable at this time  Progressing well overall, category I tracing with good contraction pattern every 3 min  Of note, there was some meconium that was noted after AROM earlier, will have NICU standby during delivery  Anticipate   Dr. Shawn Gusman MD, PGY3  [facility]

## 2025-06-06 NOTE — PROGRESS NOTES
Patient was planning on taking her placenta home with her, however she has changed her mind and would like hospital disposal. RN disposed of placenta in the usual manner.

## 2025-06-06 NOTE — PLAN OF CARE
Goal Outcome Evaluation:      Plan of Care Reviewed With: patient    Overall Patient Progress: improving    Outcome Evaluation: Patient's vital signs and OB assessments WNL. Fundus is firm, bleeding is scant. Denies pain and declined pain medication. Ambulating independently. Needs one more measured void. Introduced paperwork. Unable to get a hold of Revo Round  via ipad this AM, significant other translated. Parents are attentive to infant's needs.    Syeda Hurd RN on 6/6/2025 at 6:23 AM

## 2025-06-06 NOTE — PROGRESS NOTES
Labor Progress Note  2025 7:00 PM  ________________________________________________________________________    ASSESSMENT & PLAN:   25 year old  at 40w3d here in early labor. Cervix now 5/90/0. Progressing slowly but not unexpected. Discussed risks and benefits of AROM with patient and partner, agreeable. Performed AROM with clear fluid return. Requesting switch from nitrous oxide to IV medications, will switch to IV fentanyl.  AROM with clear return  Switch to IV fentanyl  Anticipate       Precepted patient with Dr. Dilcia Escobar.      Niurka Gusman MD, PGY-3  Resident, [facility]   Pager: [phone] or page on [employer] thad    ________________________________________________________________________    SUBJECTIVE:  Overall doing well. More uncomfortable with contractions, nitrous oxide is helping and still with bloody show but not more than expected. Desires AROM.    OBJECTIVE:  Patient Vitals for the past 24 hrs:   BP Temp Temp src Resp SpO2   25 1853 115/72 -- -- -- 98 %   25 1505 106/61 98.2  F (36.8  C) Oral 24 --   25 1220 110/63 97.9  F (36.6  C) Oral 24 --   25 0730 -- -- -- -- 98 %   25 0725 103/59 98.2  F (36.8  C) Oral 24 --   25 0425 113/67 98.2  F (36.8  C) -- 18 98 %     FHR: Category I - baseline FHR of 110 to 160 bpm, moderate FHR variability, and Absence of late or variable FHR decelerations  Uterine Contractions:  regular, every 2-3 minutes.  Cervix: dilation 5 cm , 90% effaced, soft, and 0 station.  Fluid: Clear.  Fetal Presentation: vertex.

## 2025-06-06 NOTE — PROGRESS NOTES
Data: Carson Staton transferred to [facility]28/[id number] via wheelchair. Patient transferred to Postpartum with .    Action: Receiving unit notified of transfer. Patient and support person notified of room change. Patient and belongings accompanied by Registered Nurse. Bedside report given to Syeda. Fundal check performed with receiving RN. Oriented patient to surroundings. Call light within reach.    Response: Patient tolerated transfer.    Iwona Cotton RN  2025 4:05 AM

## 2025-06-06 NOTE — PROGRESS NOTES
Patient is still able to feel contractions following epidural placement, pain is decreased. Pt reports her legs are feeling warm and tingly.

## 2025-06-06 NOTE — PLAN OF CARE
SVE 9//0. Epidural in place, pt not experiencing any pain.     Problem: Adult Inpatient Plan of Care  Goal: Optimal Comfort and Wellbeing  Outcome: Progressing  Intervention: Provide Person-Centered Care  Recent Flowsheet Documentation  Taken 6/5/2025 1930 by Iwona Cotton, RN  Trust Relationship/Rapport:   care explained   choices provided   emotional support provided   empathic listening provided   questions answered   questions encouraged   reassurance provided   thoughts/feelings acknowledged     Problem: Labor  Goal: Stable Fetal Wellbeing  Outcome: Progressing     Problem: Labor  Goal: Acceptable Pain Control  Outcome: Progressing     Problem: Labor  Goal: Normal Uterine Contraction Pattern  Outcome: Progressing     Problem: Labor  Goal: Effective Progression to Delivery  Outcome: Progressing   Goal Outcome Evaluation:      Plan of Care Reviewed With: patient, significant other    Overall Patient Progress: improvingOverall Patient Progress: improving    Outcome Evaluation: labor is progressing well. epidural placed and pt has good relief.

## 2025-06-06 NOTE — PROGRESS NOTES
Brief progress note  10:58 PM    Rechecked at bedside  Feeling quite comfortable, feeling some pressure but not much  No pain at this time  Cervical check -100/0, category I tracing  Mild lip on L side, will try repositioning, recheck at 2330  Anticipate LINCOLN Gusman MD  [facility]

## 2025-06-06 NOTE — PLAN OF CARE
Goal Outcome Evaluation:      Plan of Care Reviewed With: patient    Overall Patient Progress: improvingOverall Patient Progress: improving     Problem: Adult Inpatient Plan of Care  Goal: Plan of Care Review  Outcome: Progressing  Flowsheets (Taken 2025 1523)  Plan of Care Reviewed With: patient  Overall Patient Progress: improving     Problem: Postpartum (Vaginal Delivery)  Goal: Effective Urinary Elimination  Outcome: Progressing     Problem: Postpartum (Vaginal Delivery)  Goal: Optimal Pain Control and Function  Outcome: Progressing     Vital signs stable. Fundus firm. Lochia WDL.   Passed voiding trails. Ambulating in room independently.   Reports perineum pain. Moderate swelling noted. Pain managed with Ibuprofen and Tylenol. Utilizing sera bottle, witch hazel pads, and Dermoplast as needed. Took sitz bath this afternoon.  Breastfeeding . Drops of colostrum expressed with hand expression.  struggling to latch at breast. Lactation consulted.   Patient and FOB requested formula to supplement as needed. Encouraged to call for assistance with feedings.   Attentive to  cues. Feeding, changing diapers, and speaking positively of .   Reviewed plan of care with patient.

## 2025-06-07 ENCOUNTER — OFFICE VISIT (OUTPATIENT)
Dept: INTERPRETER SERVICES | Facility: CLINIC | Age: 25
End: 2025-06-07
Payer: COMMERCIAL

## 2025-06-07 VITALS
TEMPERATURE: 98.2 F | SYSTOLIC BLOOD PRESSURE: 89 MMHG | OXYGEN SATURATION: 99 % | HEART RATE: 76 BPM | RESPIRATION RATE: 18 BRPM | DIASTOLIC BLOOD PRESSURE: 49 MMHG

## 2025-06-07 PROCEDURE — 250N000013 HC RX MED GY IP 250 OP 250 PS 637

## 2025-06-07 PROCEDURE — T1013 SIGN LANG/ORAL INTERPRETER: HCPCS | Performed by: INTERPRETER

## 2025-06-07 PROCEDURE — 99207 PR NO CHARGE LOS: CPT | Performed by: FAMILY MEDICINE

## 2025-06-07 RX ORDER — AMOXICILLIN 250 MG
2 CAPSULE ORAL
Qty: 7 TABLET | Refills: 0 | Status: SHIPPED | OUTPATIENT
Start: 2025-06-07 | End: 2025-06-14

## 2025-06-07 RX ADMIN — IBUPROFEN 800 MG: 800 TABLET ORAL at 11:09

## 2025-06-07 RX ADMIN — METHYLCELLULOSE 1000 MG: 500 TABLET ORAL at 11:09

## 2025-06-07 ASSESSMENT — ACTIVITIES OF DAILY LIVING (ADL)
ADLS_ACUITY_SCORE: 21

## 2025-06-07 NOTE — PLAN OF CARE
Patient's vitals within normal parameters.     BP 97/57 (BP Location: Left arm, Patient Position: Semi-Anderson's, Cuff Size: Adult Small)   Pulse 75   Temp 97  F (36.1  C) (Oral)   Resp 16   SpO2 98%   Breastfeeding Unknown     Patient is ambulating well on her own and voids without difficulty. Patient reports perineal discomfort relieved with ice and tucks that were provided.     Patient is attentive to her  and demonstrates caring, positive attachment behaviors.       Goal Outcome Evaluation:      Plan of Care Reviewed With: patient    Overall Patient Progress: improvingOverall Patient Progress: improving       Irma Harrison RN

## 2025-06-07 NOTE — PLAN OF CARE
"  Problem: Adult Inpatient Plan of Care  Goal: Plan of Care Review  Description: The Plan of Care Review/Shift note should be completed every shift.  The Outcome Evaluation is a brief statement about your assessment that the patient is improving, declining, or no change.  This information will be displayed automatically on your shift  note.  6/7/2025 1231 by Dorothy Martinez RN  Outcome: Adequate for Care Transition  Flowsheets (Taken 6/7/2025 1231)  Plan of Care Reviewed With:   patient   significant other  Overall Patient Progress: improving  6/7/2025 1231 by Dorothy Martinez RN  Flowsheets  Taken 6/7/2025 1231  Plan of Care Reviewed With:   patient   significant other  Overall Patient Progress: improving  Taken 6/7/2025 1224  Plan of Care Reviewed With:   patient   significant other  Overall Patient Progress: improving  Goal: Patient-Specific Goal (Individualized)  Description: You can add care plan individualizations to a care plan. Examples of Individualization might be:  \"Parent requests to be called daily at 9am for status\", \"I have a hard time hearing out of my right ear\", or \"Do not touch me to wake me up as it startles  me\".  Outcome: Adequate for Care Transition  Goal: Absence of Hospital-Acquired Illness or Injury  Outcome: Adequate for Care Transition  Intervention: Prevent Skin Injury  Recent Flowsheet Documentation  Taken 6/7/2025 0834 by Dorothy Martinez RN  Body Position: position changed independently  Goal: Optimal Comfort and Wellbeing  Outcome: Adequate for Care Transition  Intervention: Provide Person-Centered Care  Recent Flowsheet Documentation  Taken 6/7/2025 0834 by Dorothy Martinez RN  Trust Relationship/Rapport:   care explained   choices provided   questions answered   questions encouraged  Goal: Readiness for Transition of Care  Outcome: Adequate for Care Transition     Problem: Postpartum (Vaginal Delivery)  Goal: Absence of Infection Signs and Symptoms  Outcome: Adequate for Care Transition     Problem: " Postpartum (Vaginal Delivery)  Goal: Optimal Pain Control and Function  Outcome: Adequate for Care Transition  Intervention: Prevent or Manage Pain  Recent Flowsheet Documentation  Taken 2025 by Dorothy Martinez RN  Perineal Care: perineal hygiene encouraged     Problem: Postpartum (Vaginal Delivery)  Goal: Effective Urinary Elimination  Outcome: Adequate for Care Transition  Intervention: Monitor and Manage Urinary Retention  Recent Flowsheet Documentation  Taken 2025 by Dorothy Martinez RN  Urinary Elimination Promotion: frequent voiding encouraged     Goal Outcome Evaluation:      Plan of Care Reviewed With: patient, significant other    Overall Patient Progress: improvingOverall Patient Progress: improving       Pt's fundus is firm & midline. Lochia is light with no clots. Abdominal & perineal pain managed with schedule tylenol & ibuprofen. Breastfeeding infant with no pain. Pt meets discharge criteria and has been seen by the provider. Reviewed warning signs, postpartum care, medications, and follow up care with patient & significant other.  utilized. All questions were answered with no further questions or concerns. PPMH score is 0.     D:  Patient desires discharge home.  Discharge orders received and entered by provider.  A:  Discharge instructions reviewed with the patient.  All questions and concerns addressed.  R:  Discharge criteria met.  4 Part ID bands double checked.  [place] discharged in car seat with parents.  The nursing assistant escorted patient to car .

## 2025-06-07 NOTE — DISCHARGE SUMMARY
OB Discharge Summary  Date:  2025    Name:  Carson Staton  :  2000  MRN:  [id number]    Admission Date:  2025  Delivery Date:  2025  1:24 AM  Gestational Age at Delivery:  40w4d  Discharge Date:      Principal Diagnosis:    Problem List Items Addressed This Visit       * (Principal) 40 weeks gestation of pregnancy - Primary    Relevant Orders    Breast pump - Manual/Electric     Other Diagnosis:  GDMA1    Conditions complicating Pregnancy: Diabetes:  Gestational    Procedure(s) Performed:       Condition at Discharge:  good    Summary of Hospitalization:  Carson Staton is a 25 year old  female at 40w4d who presented to [facility] on 25 with contractions and bloody show. Prenatal course was complicated by GDMA1 FGR ( resolved) and anti-M alloimmunization. AROM 1843. Patient was fully dilated and pushing after 3 hour in active labor. Second stage of labor was 1hr 50 minutes. Successful  with no labor complications. second perineal lacerations. No PPH. Patient recovering well postpartum.     On the day of discharge:   Patient reports that she is feeling well. Pain is well-controlled with PRN pain control with ibuprofen and Tylenol. The amount and color of the lochia is appropriate for the duration of recovery; patient denies clots. Tolerating a normal diet. Urinating and stooling normally. No emotional concerns. Patient states that breastfeeding is going well. Bonding well with infant.     OB History    Para Term  AB Living   1 1 1 0 0 1   SAB IAB Ectopic Multiple Live Births   0 0 0 0 1      # Outcome Date GA Lbr Isma/2nd Weight Sex Type Anes PTL Lv   1 Term 25 40w4d 03:10 / 01:50 3.11 kg (6 lb 13.7 oz) F Vag-Spont Nitrous, IV, EPI N ÁLVARO      Name: Female-Carson Staton      Apgar1: 8  Apgar5: 9       Examination:  BP 89/49 (BP Location: Left arm, Patient Position: Semi-Anderson's, Cuff Size: Adult Small)   Pulse 76   Temp 98.2  F (36.8   C) (Oral)   Resp 18   SpO2 99%   Breastfeeding Unknown    Gen: alert, normal interactions and behaviors  Cor: RRR, no murmurs/rubs/gallops  Lungs: CTAB, no wheezes or crackles   ABD: Fundus firm below umbilicus, active bowel sounds  Ext: No lower extremity edema, 2+ peripheral pulses      Discharge Medications:          Medication List        Started      senna-docusate 8.6-50 MG tablet  Commonly known as: SENOKOT-S/PERICOLACE  2 tablets, Oral, AT BEDTIME PRN            Discontinued      ondansetron 4 MG ODT tab  Commonly known as: ZOFRAN ODT     vitamin B6 50 MG Tabs  Commonly known as: pyridOXINE               Discharge Plan:   Follow up with Dr. Gusman in 2 weeks   Contraception Plan: considering patch, TBD   Patient Instructions:     Physical activity: normal   Diet: normal   Medication: tylenol, ibuprofen as needed     ANDREEA Theodore  [facility] PGY3          Precepted patient with Dr. Jahaira Waggoner.

## 2025-06-07 NOTE — LACTATION NOTE
This note was copied from a baby's chart.  Follow up Lactation Visit  Moira  via iPad    Current age:  33 hours old    Gestational age at delivery:  40w4d     Diaper count (last 24 hours):  5 wet 5 soiled Brown     Feedings (last 24 hours):  9 breast and drops Mother's expressed breastmilk     Weight Loss:  5% at 24 hours    Breastfeeding goals:  6-12 months    Past breastfeeding experience: none/prime    Labor and Delivery Events that may affect breastfeeding:  none    Maternal health risk that may affect breastfeeding:  GDM    Home Pump:  Medela MaxFlow    Breast assessment:  Breast: Round, Small, Symmetrical, and Dense, Nipple:Everted and Intact, left nipple is long, bulbous    Infant oral assessment:  Suck: Moderate and Coordinated    Feeding assessment:  Educated/reviewed importance of achieving an asymmetrical pain free latch with breastfeeding parent's nipple pointed toward baby's nose.  Assisted the dyad to achieve a latch in a side lying position to accommodate Mom's sore bottom.  Breast compression encouraged to optimize milk transfer. Audible swallows were visible.      Visit Summary:  Flora has been breastfeeding her baby successfully. She reports pain with the latch but no cracks, scabs or bleeding. After assisting with the successful deep asymmetrical latch, Mom reported no pain and was comfortable with her position. Malvin was at the bedside and participating in the education and positoning. Questions encouraged and addressed.     Feeding plan: Offer breast every 2-3 hours, or more frequently if infant cues.   Strive for a deep, asymmetrical and comfortable latch with a wide gape/open mouth for baby.   Positioning reminders: Tip nipple toward baby's nose to expose breast tissue; baby's chin should lift up into the breast tissue; baby's nose gently touching the breast. Ear, shoulder, hip, positioned in a straight line. Chin of baby should not be resting on the baby's chest. Switch sides  when swallows slow down in frequency, sucking pauses become longer in time and breast compressions do not increase the swallows or activity at the breast. If infant needs supplementation, mom should start pumping, and pump with each supplementation to promote milk supply.     Education:   []  Feeding Patterns in the First Few Days/second Night   [] Maternal Risk Factors that Could Affect Milk Supply  [x] Hand Expression  [] Stages of Milk Production  [] Feeding Cues  [] LPI Feeding Behaviors  [] LBW Feeding Behaviors  [] Rousing Techniques  [] Benefits of Skin to Skin  [x] Breastfeeding Positions  [x] Tips to Maintain a Deep Latch  [x] Nutritive vs Non-Nutritive Sucking   [x] Gentle Breast Compressions  [] [place] Weight Loss  [] How to Know When Baby is Getting Enough to Eat  [] Supplementation & Methods (including Paced Bottle Feeding)  [] Type of Supplement  [] Nipple Shield  [x] Sore Nipples  [] Pacifier Use  [] Tight Frenulum  [] Breastfeeding Twins  [] Pumping Plan  [] Breastpump Education  [x] Engorgement/Reverse Pressure Softening  [] Inpatient Lactation Support  [x] Outpatient Lactation Resources/Follow up    Follow up: LC will continue to follow up during hospital stay.

## 2025-06-10 ENCOUNTER — TELEPHONE (OUTPATIENT)
Dept: FAMILY MEDICINE | Facility: CLINIC | Age: 25
End: 2025-06-10
Payer: COMMERCIAL

## 2025-06-10 NOTE — TELEPHONE ENCOUNTER
Patient Quality Outreach    Patient is due for the following:   Hospital Follow up    Action(s) Taken:   Patient has upcoming appointment, these items will be addressed at that time.    Type of outreach:    Chart review performed, no outreach needed.     OB Discharge Summary  Date:  2025     Name:  Carson Staton  :  2000  MRN:  [id number]     Admission Date:  2025  Delivery Date:  2025  1:24 AM  Gestational Age at Delivery:  40w4d    Bhumika Rubi CMA  Clinical Care Coordinator

## 2025-06-18 ENCOUNTER — MEDICAL CORRESPONDENCE (OUTPATIENT)
Dept: HEALTH INFORMATION MANAGEMENT | Facility: CLINIC | Age: 25
End: 2025-06-18

## 2025-06-18 ENCOUNTER — OFFICE VISIT (OUTPATIENT)
Dept: FAMILY MEDICINE | Facility: CLINIC | Age: 25
End: 2025-06-18
Payer: COMMERCIAL

## 2025-06-18 VITALS
BODY MASS INDEX: 20.01 KG/M2 | HEART RATE: 80 BPM | DIASTOLIC BLOOD PRESSURE: 61 MMHG | SYSTOLIC BLOOD PRESSURE: 101 MMHG | TEMPERATURE: 98.2 F | OXYGEN SATURATION: 97 % | WEIGHT: 106 LBS | HEIGHT: 61 IN | RESPIRATION RATE: 18 BRPM

## 2025-06-18 PROCEDURE — 99213 OFFICE O/P EST LOW 20 MIN: CPT | Mod: GC

## 2025-06-18 PROCEDURE — G2211 COMPLEX E/M VISIT ADD ON: HCPCS

## 2025-06-18 NOTE — PROGRESS NOTES
Preceptor Attestation:  Patient's case reviewed and discussed with Niurka Gusman MD resident and I evaluated the patient. I agree with written assessment and plan of care.  Supervising Physician:  Jaime Mars MD,  [facility]

## 2025-06-18 NOTE — PROGRESS NOTES
"  Assessment & Plan     (Z39.1) Encounter for postpartum care of lactating mother  (primary encounter diagnosis)  Comment: Overall doing ok. 2nd degree laceration seems to be healing well per report, though still with spotty bleeding. If not improved or if pain worsens would recommend visual evaluation. Otherwise mood stable and having good milk production at this time, bonding well with baby. Recommend follow-up in 4 weeks to discuss restarting birth control patch as discussed, also recommend no intercourse until fully healed from laceration. Follow-up in 4 weeks for postpartum visit  Plan: follow-up 4 weeks    The longitudinal plan of care for the diagnosis(es)/condition(s) as documented were addressed during this visit. Due to the added complexity in care, I will continue to support Carson in the subsequent management and with ongoing continuity of care.      Follow-up 4 weeks    Ralph Byrd is a 25 year old, presenting for the following health issues:  Follow Up (Post partum follow up.)      6/18/2025    10:43 AM   Additional Questions   Roomed by Hser   Accompanied by  and daughter         6/18/2025    Information    services provided? Yes   Language Hmong   Type of interpretation provided Face-to-face    name Rosita Sky    Agency [employer]     \[facility] [place] [facility]\""      Here for postpartum follow-up  Still with mild bleeding but overall doing ok  Changing pads every 2-3 hours but not bad with mild spotting  2nd degree laceration healing well, no pain or discomfort  No cramping or abdominal discomfort        Review of Systems  Constitutional, neuro, ENT, endocrine, pulmonary, cardiac, gastrointestinal, genitourinary, musculoskeletal, integument and psychiatric systems are negative, except as otherwise noted.      Objective    /61   Pulse 80   Temp 98.2  F (36.8  C) (Tympanic)   Resp 18   Ht 1.56 m (5' 1.42\")   Wt 48.1 kg (106 lb)   SpO2 97%   BMI 19.76 " kg/m    Body mass index is 19.76 kg/m .    Physical Exam   General: Alert, no acute distress  Skin: clean, dry, and intact  HEENT: normocephalic, atraumatic, spontaneous eye movement, no injection or icterus, ears and nose normal, no neck masses  Resp: normal work of breathing, no wheezing  Cardio: RRR, S1 and S2 present  Abdomen: soft, nontender, nondistended. Deferred  exam today per preference.  Extremities: no erythema, no edema  Psych: normal mood, normal affect          Signed Electronically by: Niurka Gusman MD  Precepted patient with Dr. Jaime Mars.

## 2025-07-16 ENCOUNTER — MEDICAL CORRESPONDENCE (OUTPATIENT)
Dept: HEALTH INFORMATION MANAGEMENT | Facility: CLINIC | Age: 25
End: 2025-07-16

## 2025-08-11 DIAGNOSIS — Z30.016 ENCOUNTER FOR INITIAL PRESCRIPTION OF TRANSDERMAL PATCH HORMONAL CONTRACEPTIVE DEVICE: ICD-10-CM

## 2025-08-11 RX ORDER — NORELGESTROMIN AND ETHINYL ESTRADIOL 35; 150 UG/MG; UG/MG
PATCH TRANSDERMAL
Qty: 9 PATCH | Refills: 3 | Status: SHIPPED | OUTPATIENT
Start: 2025-08-11

## 2025-08-25 ENCOUNTER — OFFICE VISIT (OUTPATIENT)
Dept: FAMILY MEDICINE | Facility: CLINIC | Age: 25
End: 2025-08-25
Payer: COMMERCIAL

## 2025-08-25 VITALS
BODY MASS INDEX: 20.01 KG/M2 | SYSTOLIC BLOOD PRESSURE: 96 MMHG | TEMPERATURE: 98 F | RESPIRATION RATE: 18 BRPM | DIASTOLIC BLOOD PRESSURE: 61 MMHG | HEART RATE: 74 BPM | HEIGHT: 61 IN | WEIGHT: 106 LBS | OXYGEN SATURATION: 99 %

## 2025-08-25 DIAGNOSIS — Z30.09 ENCOUNTER FOR OTHER GENERAL COUNSELING OR ADVICE ON CONTRACEPTION: ICD-10-CM
